# Patient Record
Sex: FEMALE | Race: WHITE | NOT HISPANIC OR LATINO | URBAN - METROPOLITAN AREA
[De-identification: names, ages, dates, MRNs, and addresses within clinical notes are randomized per-mention and may not be internally consistent; named-entity substitution may affect disease eponyms.]

---

## 2020-01-09 ENCOUNTER — INPATIENT (INPATIENT)
Facility: HOSPITAL | Age: 70
LOS: 6 days | Discharge: HOME | DRG: 194 | End: 2020-01-16
Attending: INTERNAL MEDICINE | Admitting: INTERNAL MEDICINE
Payer: MEDICARE

## 2020-01-09 VITALS
WEIGHT: 169.98 LBS | RESPIRATION RATE: 18 BRPM | HEIGHT: 67 IN | OXYGEN SATURATION: 97 % | SYSTOLIC BLOOD PRESSURE: 140 MMHG | HEART RATE: 98 BPM | TEMPERATURE: 99 F | DIASTOLIC BLOOD PRESSURE: 86 MMHG

## 2020-01-09 DIAGNOSIS — E78.5 HYPERLIPIDEMIA, UNSPECIFIED: ICD-10-CM

## 2020-01-09 DIAGNOSIS — N39.0 URINARY TRACT INFECTION, SITE NOT SPECIFIED: ICD-10-CM

## 2020-01-09 DIAGNOSIS — J15.9 UNSPECIFIED BACTERIAL PNEUMONIA: ICD-10-CM

## 2020-01-09 DIAGNOSIS — N12 TUBULO-INTERSTITIAL NEPHRITIS, NOT SPECIFIED AS ACUTE OR CHRONIC: ICD-10-CM

## 2020-01-09 DIAGNOSIS — I10 ESSENTIAL (PRIMARY) HYPERTENSION: ICD-10-CM

## 2020-01-09 DIAGNOSIS — Z29.9 ENCOUNTER FOR PROPHYLACTIC MEASURES, UNSPECIFIED: ICD-10-CM

## 2020-01-09 DIAGNOSIS — I27.82 CHRONIC PULMONARY EMBOLISM: ICD-10-CM

## 2020-01-09 DIAGNOSIS — N28.89 OTHER SPECIFIED DISORDERS OF KIDNEY AND URETER: ICD-10-CM

## 2020-01-09 LAB
ALBUMIN SERPL ELPH-MCNC: 2.8 G/DL — LOW (ref 3.5–5)
ALBUMIN SERPL ELPH-MCNC: 3.1 G/DL — LOW (ref 3.5–5)
ALP SERPL-CCNC: 88 U/L — SIGNIFICANT CHANGE UP (ref 40–120)
ALP SERPL-CCNC: 97 U/L — SIGNIFICANT CHANGE UP (ref 40–120)
ALT FLD-CCNC: 24 U/L DA — SIGNIFICANT CHANGE UP (ref 10–60)
ALT FLD-CCNC: 30 U/L DA — SIGNIFICANT CHANGE UP (ref 10–60)
ANION GAP SERPL CALC-SCNC: 11 MMOL/L — SIGNIFICANT CHANGE UP (ref 5–17)
ANION GAP SERPL CALC-SCNC: 8 MMOL/L — SIGNIFICANT CHANGE UP (ref 5–17)
APPEARANCE UR: CLEAR — SIGNIFICANT CHANGE UP
AST SERPL-CCNC: 17 U/L — SIGNIFICANT CHANGE UP (ref 10–40)
AST SERPL-CCNC: 22 U/L — SIGNIFICANT CHANGE UP (ref 10–40)
BASOPHILS # BLD AUTO: 0 K/UL — SIGNIFICANT CHANGE UP (ref 0–0.2)
BASOPHILS # BLD AUTO: 0 K/UL — SIGNIFICANT CHANGE UP (ref 0–0.2)
BASOPHILS NFR BLD AUTO: 0 % — SIGNIFICANT CHANGE UP (ref 0–2)
BASOPHILS NFR BLD AUTO: 0 % — SIGNIFICANT CHANGE UP (ref 0–2)
BILIRUB SERPL-MCNC: 0.4 MG/DL — SIGNIFICANT CHANGE UP (ref 0.2–1.2)
BILIRUB SERPL-MCNC: 0.5 MG/DL — SIGNIFICANT CHANGE UP (ref 0.2–1.2)
BILIRUB UR-MCNC: NEGATIVE — SIGNIFICANT CHANGE UP
BUN SERPL-MCNC: 22 MG/DL — HIGH (ref 7–18)
BUN SERPL-MCNC: 23 MG/DL — HIGH (ref 7–18)
CALCIUM SERPL-MCNC: 8 MG/DL — LOW (ref 8.4–10.5)
CALCIUM SERPL-MCNC: 8.7 MG/DL — SIGNIFICANT CHANGE UP (ref 8.4–10.5)
CHLORIDE SERPL-SCNC: 105 MMOL/L — SIGNIFICANT CHANGE UP (ref 96–108)
CHLORIDE SERPL-SCNC: 110 MMOL/L — HIGH (ref 96–108)
CHOLEST SERPL-MCNC: 114 MG/DL — SIGNIFICANT CHANGE UP (ref 10–199)
CO2 SERPL-SCNC: 20 MMOL/L — LOW (ref 22–31)
CO2 SERPL-SCNC: 24 MMOL/L — SIGNIFICANT CHANGE UP (ref 22–31)
COLOR SPEC: YELLOW — SIGNIFICANT CHANGE UP
CREAT SERPL-MCNC: 1.14 MG/DL — SIGNIFICANT CHANGE UP (ref 0.5–1.3)
CREAT SERPL-MCNC: 1.16 MG/DL — SIGNIFICANT CHANGE UP (ref 0.5–1.3)
DIFF PNL FLD: ABNORMAL
EOSINOPHIL # BLD AUTO: 0 K/UL — SIGNIFICANT CHANGE UP (ref 0–0.5)
EOSINOPHIL # BLD AUTO: 0 K/UL — SIGNIFICANT CHANGE UP (ref 0–0.5)
EOSINOPHIL NFR BLD AUTO: 0 % — SIGNIFICANT CHANGE UP (ref 0–6)
EOSINOPHIL NFR BLD AUTO: 0 % — SIGNIFICANT CHANGE UP (ref 0–6)
FLU A RESULT: SIGNIFICANT CHANGE UP
FLU A RESULT: SIGNIFICANT CHANGE UP
FLUAV AG NPH QL: SIGNIFICANT CHANGE UP
FLUBV AG NPH QL: SIGNIFICANT CHANGE UP
GLUCOSE SERPL-MCNC: 115 MG/DL — HIGH (ref 70–99)
GLUCOSE SERPL-MCNC: 122 MG/DL — HIGH (ref 70–99)
GLUCOSE UR QL: NEGATIVE — SIGNIFICANT CHANGE UP
HCT VFR BLD CALC: 38.4 % — SIGNIFICANT CHANGE UP (ref 34.5–45)
HCT VFR BLD CALC: 39.3 % — SIGNIFICANT CHANGE UP (ref 34.5–45)
HDLC SERPL-MCNC: 72 MG/DL — SIGNIFICANT CHANGE UP
HGB BLD-MCNC: 12.2 G/DL — SIGNIFICANT CHANGE UP (ref 11.5–15.5)
HGB BLD-MCNC: 12.7 G/DL — SIGNIFICANT CHANGE UP (ref 11.5–15.5)
KETONES UR-MCNC: NEGATIVE — SIGNIFICANT CHANGE UP
LACTATE SERPL-SCNC: 1.6 MMOL/L — SIGNIFICANT CHANGE UP (ref 0.7–2)
LEUKOCYTE ESTERASE UR-ACNC: ABNORMAL
LIDOCAIN IGE QN: 52 U/L — LOW (ref 73–393)
LIPID PNL WITH DIRECT LDL SERPL: 29 MG/DL — SIGNIFICANT CHANGE UP
LYMPHOCYTES # BLD AUTO: 0.2 K/UL — LOW (ref 1–3.3)
LYMPHOCYTES # BLD AUTO: 0.52 K/UL — LOW (ref 1–3.3)
LYMPHOCYTES # BLD AUTO: 1 % — LOW (ref 13–44)
LYMPHOCYTES # BLD AUTO: 2 % — LOW (ref 13–44)
MAGNESIUM SERPL-MCNC: 1.3 MG/DL — LOW (ref 1.6–2.6)
MANUAL SMEAR VERIFICATION: SIGNIFICANT CHANGE UP
MCHC RBC-ENTMCNC: 27 PG — SIGNIFICANT CHANGE UP (ref 27–34)
MCHC RBC-ENTMCNC: 27.1 PG — SIGNIFICANT CHANGE UP (ref 27–34)
MCHC RBC-ENTMCNC: 31.8 GM/DL — LOW (ref 32–36)
MCHC RBC-ENTMCNC: 32.3 GM/DL — SIGNIFICANT CHANGE UP (ref 32–36)
MCV RBC AUTO: 83.6 FL — SIGNIFICANT CHANGE UP (ref 80–100)
MCV RBC AUTO: 85.3 FL — SIGNIFICANT CHANGE UP (ref 80–100)
METAMYELOCYTES # FLD: 3 % — HIGH (ref 0–0)
MONOCYTES # BLD AUTO: 0.41 K/UL — SIGNIFICANT CHANGE UP (ref 0–0.9)
MONOCYTES # BLD AUTO: 1.03 K/UL — HIGH (ref 0–0.9)
MONOCYTES NFR BLD AUTO: 2 % — SIGNIFICANT CHANGE UP (ref 2–14)
MONOCYTES NFR BLD AUTO: 4 % — SIGNIFICANT CHANGE UP (ref 2–14)
MYELOCYTES NFR BLD: 2 % — HIGH (ref 0–0)
NEUTROPHILS # BLD AUTO: 18.62 K/UL — HIGH (ref 1.8–7.4)
NEUTROPHILS # BLD AUTO: 24.03 K/UL — HIGH (ref 1.8–7.4)
NEUTROPHILS NFR BLD AUTO: 62 % — SIGNIFICANT CHANGE UP (ref 43–77)
NEUTROPHILS NFR BLD AUTO: 76 % — SIGNIFICANT CHANGE UP (ref 43–77)
NEUTS BAND # BLD: 29 % — HIGH (ref 0–8)
NITRITE UR-MCNC: NEGATIVE — SIGNIFICANT CHANGE UP
NRBC # BLD: 0 /100 — SIGNIFICANT CHANGE UP (ref 0–0)
PH UR: 6 — SIGNIFICANT CHANGE UP (ref 5–8)
PHOSPHATE SERPL-MCNC: 1.6 MG/DL — LOW (ref 2.5–4.5)
PLAT MORPH BLD: NORMAL — SIGNIFICANT CHANGE UP
PLATELET # BLD AUTO: 287 K/UL — SIGNIFICANT CHANGE UP (ref 150–400)
PLATELET # BLD AUTO: 318 K/UL — SIGNIFICANT CHANGE UP (ref 150–400)
POTASSIUM SERPL-MCNC: 3.7 MMOL/L — SIGNIFICANT CHANGE UP (ref 3.5–5.3)
POTASSIUM SERPL-MCNC: 5 MMOL/L — SIGNIFICANT CHANGE UP (ref 3.5–5.3)
POTASSIUM SERPL-SCNC: 3.7 MMOL/L — SIGNIFICANT CHANGE UP (ref 3.5–5.3)
POTASSIUM SERPL-SCNC: 5 MMOL/L — SIGNIFICANT CHANGE UP (ref 3.5–5.3)
PROT SERPL-MCNC: 6.3 G/DL — SIGNIFICANT CHANGE UP (ref 6–8.3)
PROT SERPL-MCNC: 6.8 G/DL — SIGNIFICANT CHANGE UP (ref 6–8.3)
PROT UR-MCNC: 100
RBC # BLD: 4.5 M/UL — SIGNIFICANT CHANGE UP (ref 3.8–5.2)
RBC # BLD: 4.7 M/UL — SIGNIFICANT CHANGE UP (ref 3.8–5.2)
RBC # FLD: 14.8 % — HIGH (ref 10.3–14.5)
RBC # FLD: 15.1 % — HIGH (ref 10.3–14.5)
RBC BLD AUTO: NORMAL — SIGNIFICANT CHANGE UP
RSV RESULT: SIGNIFICANT CHANGE UP
RSV RNA RESP QL NAA+PROBE: SIGNIFICANT CHANGE UP
SODIUM SERPL-SCNC: 137 MMOL/L — SIGNIFICANT CHANGE UP (ref 135–145)
SODIUM SERPL-SCNC: 141 MMOL/L — SIGNIFICANT CHANGE UP (ref 135–145)
SP GR SPEC: 1.01 — SIGNIFICANT CHANGE UP (ref 1.01–1.02)
TOTAL CHOLESTEROL/HDL RATIO MEASUREMENT: 1.6 RATIO — LOW (ref 3.3–7.1)
TRIGL SERPL-MCNC: 67 MG/DL — SIGNIFICANT CHANGE UP (ref 10–149)
TROPONIN I SERPL-MCNC: <0.015 NG/ML — SIGNIFICANT CHANGE UP (ref 0–0.04)
TSH SERPL-MCNC: 1.55 UU/ML — SIGNIFICANT CHANGE UP (ref 0.34–4.82)
UROBILINOGEN FLD QL: NEGATIVE — SIGNIFICANT CHANGE UP
VARIANT LYMPHS # BLD: 1 % — SIGNIFICANT CHANGE UP (ref 0–6)
WBC # BLD: 20.46 K/UL — HIGH (ref 3.8–10.5)
WBC # BLD: 25.84 K/UL — HIGH (ref 3.8–10.5)
WBC # FLD AUTO: 20.46 K/UL — HIGH (ref 3.8–10.5)
WBC # FLD AUTO: 25.84 K/UL — HIGH (ref 3.8–10.5)

## 2020-01-09 PROCEDURE — 71046 X-RAY EXAM CHEST 2 VIEWS: CPT | Mod: 26

## 2020-01-09 PROCEDURE — 76775 US EXAM ABDO BACK WALL LIM: CPT | Mod: 26

## 2020-01-09 PROCEDURE — 71275 CT ANGIOGRAPHY CHEST: CPT | Mod: 26

## 2020-01-09 PROCEDURE — 93970 EXTREMITY STUDY: CPT | Mod: 26

## 2020-01-09 PROCEDURE — 99285 EMERGENCY DEPT VISIT HI MDM: CPT

## 2020-01-09 PROCEDURE — 76770 US EXAM ABDO BACK WALL COMP: CPT | Mod: 26

## 2020-01-09 RX ORDER — POTASSIUM PHOSPHATE, MONOBASIC POTASSIUM PHOSPHATE, DIBASIC 236; 224 MG/ML; MG/ML
15 INJECTION, SOLUTION INTRAVENOUS ONCE
Refills: 0 | Status: COMPLETED | OUTPATIENT
Start: 2020-01-09 | End: 2020-01-10

## 2020-01-09 RX ORDER — SIMVASTATIN 20 MG/1
40 TABLET, FILM COATED ORAL AT BEDTIME
Refills: 0 | Status: DISCONTINUED | OUTPATIENT
Start: 2020-01-09 | End: 2020-01-16

## 2020-01-09 RX ORDER — ENOXAPARIN SODIUM 100 MG/ML
70 INJECTION SUBCUTANEOUS
Refills: 0 | Status: DISCONTINUED | OUTPATIENT
Start: 2020-01-09 | End: 2020-01-14

## 2020-01-09 RX ORDER — AZITHROMYCIN 500 MG/1
500 TABLET, FILM COATED ORAL ONCE
Refills: 0 | Status: COMPLETED | OUTPATIENT
Start: 2020-01-09 | End: 2020-01-09

## 2020-01-09 RX ORDER — ACETAMINOPHEN 500 MG
650 TABLET ORAL EVERY 6 HOURS
Refills: 0 | Status: DISCONTINUED | OUTPATIENT
Start: 2020-01-09 | End: 2020-01-16

## 2020-01-09 RX ORDER — VENLAFAXINE HCL 75 MG
1 CAPSULE, EXT RELEASE 24 HR ORAL
Qty: 0 | Refills: 0 | DISCHARGE

## 2020-01-09 RX ORDER — ENOXAPARIN SODIUM 100 MG/ML
40 INJECTION SUBCUTANEOUS DAILY
Refills: 0 | Status: DISCONTINUED | OUTPATIENT
Start: 2020-01-09 | End: 2020-01-09

## 2020-01-09 RX ORDER — SIMVASTATIN 20 MG/1
1 TABLET, FILM COATED ORAL
Qty: 0 | Refills: 0 | DISCHARGE

## 2020-01-09 RX ORDER — CEFTRIAXONE 500 MG/1
1000 INJECTION, POWDER, FOR SOLUTION INTRAMUSCULAR; INTRAVENOUS ONCE
Refills: 0 | Status: COMPLETED | OUTPATIENT
Start: 2020-01-09 | End: 2020-01-09

## 2020-01-09 RX ORDER — ACETAMINOPHEN 500 MG
1000 TABLET ORAL ONCE
Refills: 0 | Status: COMPLETED | OUTPATIENT
Start: 2020-01-09 | End: 2020-01-09

## 2020-01-09 RX ORDER — AMLODIPINE BESYLATE 2.5 MG/1
1 TABLET ORAL
Qty: 0 | Refills: 0 | DISCHARGE

## 2020-01-09 RX ORDER — METOPROLOL TARTRATE 50 MG
1 TABLET ORAL
Qty: 0 | Refills: 0 | DISCHARGE

## 2020-01-09 RX ORDER — VENLAFAXINE HCL 75 MG
150 CAPSULE, EXT RELEASE 24 HR ORAL DAILY
Refills: 0 | Status: DISCONTINUED | OUTPATIENT
Start: 2020-01-09 | End: 2020-01-16

## 2020-01-09 RX ORDER — MAGNESIUM SULFATE 500 MG/ML
2 VIAL (ML) INJECTION ONCE
Refills: 0 | Status: COMPLETED | OUTPATIENT
Start: 2020-01-09 | End: 2020-01-09

## 2020-01-09 RX ORDER — SODIUM CHLORIDE 9 MG/ML
2000 INJECTION INTRAMUSCULAR; INTRAVENOUS; SUBCUTANEOUS ONCE
Refills: 0 | Status: COMPLETED | OUTPATIENT
Start: 2020-01-09 | End: 2020-01-09

## 2020-01-09 RX ORDER — KETOROLAC TROMETHAMINE 30 MG/ML
30 SYRINGE (ML) INJECTION ONCE
Refills: 0 | Status: DISCONTINUED | OUTPATIENT
Start: 2020-01-09 | End: 2020-01-09

## 2020-01-09 RX ADMIN — Medication 30 MILLIGRAM(S): at 13:14

## 2020-01-09 RX ADMIN — CEFTRIAXONE 100 MILLIGRAM(S): 500 INJECTION, POWDER, FOR SOLUTION INTRAMUSCULAR; INTRAVENOUS at 18:30

## 2020-01-09 RX ADMIN — AZITHROMYCIN 255 MILLIGRAM(S): 500 TABLET, FILM COATED ORAL at 19:02

## 2020-01-09 RX ADMIN — Medication 400 MILLIGRAM(S): at 13:13

## 2020-01-09 RX ADMIN — Medication 50 GRAM(S): at 22:52

## 2020-01-09 RX ADMIN — SODIUM CHLORIDE 2000 MILLILITER(S): 9 INJECTION INTRAMUSCULAR; INTRAVENOUS; SUBCUTANEOUS at 13:13

## 2020-01-09 NOTE — H&P ADULT - PROBLEM SELECTOR PLAN 7
IMPROVE VTE Individual Risk Assessment   RISK                                                          Points  [  ] Previous VTE                                                3  [  ] Thrombophilia                                             2  [  ] Lower limb paralysis                                    2        (unable to hold up >15 seconds)    [  ] Current Cancer                                             2         (within 6 months)  [  x] Immobilization > 24 hrs                              1  [  ] ICU/CCU stay > 24 hours                            1  [x  ] Age > 60                                                    1  IMPROVE VTE Score _________1  Lovenox for DVT prophylaxis

## 2020-01-09 NOTE — ED PROVIDER NOTE - OBJECTIVE STATEMENT
68 y/o F pt with a PMHx of HTN, presents to the ED with complaints of 2 week hx of URI symptoms. Patient reports she originally had cough and low grade fever x 2 weeks and treated with NSAIDs with mild improvement. Notes her cough is nonproductive and persistent. Patient reports in the last 2 days her fever worsened and she developed flank pain. Notes she had nausea that has now resolved. Patient denies dysuria, urinary urgency, urinary frequency, hematuria, abdominal pain, chest pain, shortness of breath, lower extremity edema or any other complaints. Allergies: sulfa drugs

## 2020-01-09 NOTE — ED ADULT NURSE NOTE - OBJECTIVE STATEMENT
RN KATELYN SY COVERING NOTES: AOX4 +ambulatory patient reports generalized bodyaches, cough x 1 week. No other acute complaints

## 2020-01-09 NOTE — ED PROVIDER NOTE - PROGRESS NOTE DETAILS
Labs show evidence of Pyelo. Discussed admission with patient. Requesting more time to consider admission. Patient endorsed to Dr. alfonso to follow up imaging and disposition.

## 2020-01-09 NOTE — ED PROVIDER NOTE - ENMT, MLM
[facility] Call Center    Phone Message    Name of Caller: Atilio    Phone Number: Cell number on file:    Telephone Information:   Mobile [phone]       Best time to return call: any    May a detailed message be left on voicemail: yes    Relation to patient: Self    Reason for Call: Other: Patient states he has questions about if he should be making a follow up appointment. Patient states that Dr. Vega put him on anitbotics and is wondering if it would be worth it to make a follow up appointment.     Action Taken: Message routed to:  Adult Clinics: Urology p [id number]     Airway patent, Nasal mucosa clear. Mouth with normal mucosa. Throat has no vesicles, no oropharyngeal exudates and uvula is midline.

## 2020-01-09 NOTE — ED ADULT NURSE NOTE - ED STAT RN HANDOFF DETAILS
received  pt.in bed at 0300 pt.is  awake and responsive.  denies  pain. transfer to rm 621 report given by previous rn ramana.not in distress

## 2020-01-09 NOTE — ED PROVIDER NOTE - CARE PLAN
Principal Discharge DX:	Pyelonephritis Principal Discharge DX:	Pyelonephritis  Secondary Diagnosis:	Pneumonia due to infectious organism, unspecified laterality, unspecified part of lung

## 2020-01-09 NOTE — H&P ADULT - HISTORY OF PRESENT ILLNESS
Pt is a 68 y/o F with PMH of HTN, HLD, Latent TB who presented with cough, runny nose, weakness and fever since 1 and half week.  According to  the pt, she started developing cough with whitish sputum since 1 1/2 week which is getting worse. She had some blood on her sputum today.  She has been having low grade fever since same time period along with chills,  generalized weakness, decreased appetite, headache, back pain. Today when she went to [facility],  she felt like almost passing out and looked pale and clammy as per .  She also complained of burning sensation while passing urine  and occasional urinary incontinence. Pt is a 70 y/o F with PMH of HTN, HLD, Latent TB PSH of rt. knee surgery who presented with cough, runny nose, weakness and fever since 1 and half week.  According to  the pt, she started developing cough with whitish sputum since 1 1/2 week which is getting worse. She had some blood on her sputum today.  She has been having low grade fever since same time period along with chills,  generalized weakness, decreased appetite, headache, back pain. Today when she went to [facility],  she felt like almost passing out and looked pale and clammy as per .  She also complained of burning sensation while passing urine  and occasional urinary incontinence.

## 2020-01-09 NOTE — H&P ADULT - NEUROLOGICAL DETAILS
responds to verbal commands/responds to pain/deep reflexes intact/cranial nerves intact/alert and oriented x 3/sensation intact/normal strength

## 2020-01-09 NOTE — H&P ADULT - PROBLEM SELECTOR PLAN 5
IMPROVE VTE Individual Risk Assessment   RISK                                                          Points  [  ] Previous VTE                                                3  [  ] Thrombophilia                                             2  [  ] Lower limb paralysis                                    2        (unable to hold up >15 seconds)    [  ] Current Cancer                                             2         (within 6 months)  [  x] Immobilization > 24 hrs                              1  [  ] ICU/CCU stay > 24 hours                            1  [x  ] Age > 60                                                    1  IMPROVE VTE Score _________1  Lovenox for DVT prophylaxis Pt takes amlodipine and metoprolol at home  We will hold BP meds in the setting of PNA  Monitor Blood pressure

## 2020-01-09 NOTE — ED PROVIDER NOTE - CHPI ED SYMPTOMS NEG
no dysuria, no urinary urgency, no urinary frequency, no hematuria, no abdominal pain, no chest pain, no shortness of breath, no lower extremity edema

## 2020-01-09 NOTE — H&P ADULT - NEGATIVE NEUROLOGICAL SYMPTOMS
no weakness/no generalized seizures/no vertigo/no difficulty walking/no transient paralysis/no paresthesias/no syncope

## 2020-01-09 NOTE — H&P ADULT - PROBLEM SELECTOR PLAN 4
Right upper pole renal mass or prominent congenital lobulation was noted on CTA chest.  Urology consult at am

## 2020-01-09 NOTE — H&P ADULT - PROBLEM SELECTOR PLAN 3
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 Lindsey consulted

## 2020-01-09 NOTE — ED PROVIDER NOTE - CLINICAL SUMMARY MEDICAL DECISION MAKING FREE TEXT BOX
70 y/o F pt presents with URI symptoms and fevers. Will do cbc, abdominal labs, cardiac labs, UA culture, lactate and reassess.

## 2020-01-09 NOTE — PROGRESS NOTE ADULT - ASSESSMENT
francia nd examined  pneumonia  and uti ( had symptoms)   iv fluids and rocephine zithro.  flue A B,   pulm embolus ?/ i d/w pulmonary  full dose anticogulation    lactate, vbg  and watch carefully on floor otherwise icu d/w resident  watch bp.

## 2020-01-09 NOTE — H&P ADULT - RS GEN PE MLT RESP DETAILS PC
breath sounds equal/clear to auscultation bilaterally/airway patent/respirations non-labored/good air movement/no chest wall tenderness

## 2020-01-09 NOTE — H&P ADULT - PROBLEM SELECTOR PLAN 1
CTA chest showed signs of right lower lobe  PNA  Continue Ceftriaxone and Azithromycin  F/u Blood culture. ABx started at ED before sending blood culture.

## 2020-01-09 NOTE — H&P ADULT - NSHPLABSRESULTS_GEN_ALL_CORE
12.2   20.46 )-----------( 287      ( 09 Jan 2020 20:59 )             38.4       01-09    141  |  110<H>  |  22<H>  ----------------------------<  122<H>  3.7   |  20<L>  |  1.16    Ca    8.0<L>      09 Jan 2020 20:59  Phos  1.6     01-09  Mg     1.3     01-09    TPro  6.3  /  Alb  2.8<L>  /  TBili  0.4  /  DBili  x   /  AST  17  /  ALT  24  /  AlkPhos  88  01-09

## 2020-01-09 NOTE — PROGRESS NOTE ADULT - SUBJECTIVE AND OBJECTIVE BOX
HPI: 69 year old female brought in for cough over 10 days mildly productive   had low grade fever  ( her  was sick initially)  no muscle pain pt was keep coughing never got better from last a few days getting worse  no apetite  and today she felt very weak almost passed out  brought to er  wher has some blood in cough  minimal.  non smoker not on AC.  wbc 25, cxr showed infiltate, ct angio showed old PE   venous doppler negat.  no recent surgery or travel  pt is over wt but active . given iv fluids and iv rocephine   pt is alert awke x 3     pt has h/o htn, hld, rt knee and ankle           Patient is a 69y old  Female who presents with a chief complaint of     INTERVAL HPI/OVERNIGHT EVENTS:  T(C): 37.1 (20 @ 15:56), Max: 37.2 (20 @ 11:33)  HR: 97 (20 @ 15:56) (97 - 98)  BP: 130/63 (20 @ 15:56) (130/63 - 140/86)  RR: 18 (20 @ 15:56) (18 - 18)  SpO2: 96% (20 @ 15:56) (96% - 97%)  Wt(kg): --  I&O's Summary      REVIEW OF SYSTEMS: denies fever, chills, SOB, palpitations, chest pain, abdominal pain, nausea, vomitting, diarrhea, constipation, dizziness    MEDICATIONS  (STANDING):    MEDICATIONS  (PRN):      PHYSICAL EXAM:  GENERAL: NAD, well-groomed, well-developed  HEAD:  Atraumatic, Normocephalic  EYES: EOMI, PERRLA, conjunctiva and sclera clear  ENMT: No tonsillar erythema, exudates, or enlargement; Moist mucous membranes, Good dentition, No lesions  NECK: Supple, No JVD, Normal thyroid  NERVOUS SYSTEM:  Alert & Oriented X3, Good concentration; Motor Strength 5/5 B/L upper and lower extremities; DTRs 2+ intact and symmetric  CHEST/LUNG: Clear to percussion bilaterally; No rales, rhonchi, wheezing, or rubs  HEART: Regular rate and rhythm; No murmurs, rubs, or gallops  ABDOMEN: Soft, Nontender, Nondistended; Bowel sounds present  EXTREMITIES:  2+ Peripheral Pulses, No clubbing, cyanosis, or edema  LYMPH: No lymphadenopathy noted  SKIN: No rashes or lesions  LABS:                        12.2   x     )-----------( 287      ( 2020 20:59 )             38.4     -    137  |  105  |  23<H>  ----------------------------<  115<H>  5.0   |  24  |  1.14    Ca    8.7      2020 13:09    TPro  6.8  /  Alb  3.1<L>  /  TBili  0.5  /  DBili  x   /  AST  22  /  ALT  30  /  AlkPhos  97  01-09      Urinalysis Basic - ( 2020 15:26 )    Color: Yellow / Appearance: Clear / S.010 / pH: x  Gluc: x / Ketone: Negative  / Bili: Negative / Urobili: Negative   Blood: x / Protein: 100 / Nitrite: Negative   Leuk Esterase: Small / RBC: 0-2 /HPF / WBC 26-50 /HPF   Sq Epi: x / Non Sq Epi: Few /HPF / Bacteria: TNTC /HPF      CAPILLARY BLOOD GLUCOSE            Urinalysis Basic - ( 2020 15:26 )    Color: Yellow / Appearance: Clear / S.010 / pH: x  Gluc: x / Ketone: Negative  / Bili: Negative / Urobili: Negative   Blood: x / Protein: 100 / Nitrite: Negative   Leuk Esterase: Small / RBC: 0-2 /HPF / WBC 26-50 /HPF   Sq Epi: x / Non Sq Epi: Few /HPF / Bacteria: TNTC /HPF

## 2020-01-10 ENCOUNTER — TRANSCRIPTION ENCOUNTER (OUTPATIENT)
Age: 70
End: 2020-01-10

## 2020-01-10 DIAGNOSIS — Z71.89 OTHER SPECIFIED COUNSELING: ICD-10-CM

## 2020-01-10 LAB
24R-OH-CALCIDIOL SERPL-MCNC: 20.9 NG/ML — LOW (ref 30–80)
24R-OH-CALCIDIOL SERPL-MCNC: 22.2 NG/ML — LOW (ref 30–80)
ANION GAP SERPL CALC-SCNC: 11 MMOL/L — SIGNIFICANT CHANGE UP (ref 5–17)
BUN SERPL-MCNC: 21 MG/DL — HIGH (ref 7–18)
CALCIUM SERPL-MCNC: 8.5 MG/DL — SIGNIFICANT CHANGE UP (ref 8.4–10.5)
CHLORIDE SERPL-SCNC: 103 MMOL/L — SIGNIFICANT CHANGE UP (ref 96–108)
CHOLEST SERPL-MCNC: 120 MG/DL — SIGNIFICANT CHANGE UP (ref 10–199)
CO2 SERPL-SCNC: 23 MMOL/L — SIGNIFICANT CHANGE UP (ref 22–31)
CREAT SERPL-MCNC: 1.02 MG/DL — SIGNIFICANT CHANGE UP (ref 0.5–1.3)
GLUCOSE SERPL-MCNC: 111 MG/DL — HIGH (ref 70–99)
HBA1C BLD-MCNC: 5.6 % — SIGNIFICANT CHANGE UP (ref 4–5.6)
HBA1C BLD-MCNC: 5.8 % — HIGH (ref 4–5.6)
HCT VFR BLD CALC: 37.6 % — SIGNIFICANT CHANGE UP (ref 34.5–45)
HCV AB S/CO SERPL IA: 0.09 S/CO — SIGNIFICANT CHANGE UP (ref 0–0.99)
HCV AB SERPL-IMP: SIGNIFICANT CHANGE UP
HDLC SERPL-MCNC: 61 MG/DL — SIGNIFICANT CHANGE UP
HGB BLD-MCNC: 12.2 G/DL — SIGNIFICANT CHANGE UP (ref 11.5–15.5)
LIPID PNL WITH DIRECT LDL SERPL: 41 MG/DL — SIGNIFICANT CHANGE UP
MAGNESIUM SERPL-MCNC: 2.4 MG/DL — SIGNIFICANT CHANGE UP (ref 1.6–2.6)
MCHC RBC-ENTMCNC: 27.3 PG — SIGNIFICANT CHANGE UP (ref 27–34)
MCHC RBC-ENTMCNC: 32.4 GM/DL — SIGNIFICANT CHANGE UP (ref 32–36)
MCV RBC AUTO: 84.1 FL — SIGNIFICANT CHANGE UP (ref 80–100)
NRBC # BLD: 0 /100 WBCS — SIGNIFICANT CHANGE UP (ref 0–0)
PHOSPHATE SERPL-MCNC: 3.6 MG/DL — SIGNIFICANT CHANGE UP (ref 2.5–4.5)
PLATELET # BLD AUTO: 286 K/UL — SIGNIFICANT CHANGE UP (ref 150–400)
POTASSIUM SERPL-MCNC: 3.8 MMOL/L — SIGNIFICANT CHANGE UP (ref 3.5–5.3)
POTASSIUM SERPL-SCNC: 3.8 MMOL/L — SIGNIFICANT CHANGE UP (ref 3.5–5.3)
PROCALCITONIN SERPL-MCNC: 3.87 NG/ML — HIGH (ref 0.02–0.1)
RBC # BLD: 4.47 M/UL — SIGNIFICANT CHANGE UP (ref 3.8–5.2)
RBC # FLD: 15.4 % — HIGH (ref 10.3–14.5)
SODIUM SERPL-SCNC: 137 MMOL/L — SIGNIFICANT CHANGE UP (ref 135–145)
TOTAL CHOLESTEROL/HDL RATIO MEASUREMENT: 2 RATIO — LOW (ref 3.3–7.1)
TRIGL SERPL-MCNC: 91 MG/DL — SIGNIFICANT CHANGE UP (ref 10–149)
TSH SERPL-MCNC: 1.21 UU/ML — SIGNIFICANT CHANGE UP (ref 0.34–4.82)
VIT B12 SERPL-MCNC: 1202 PG/ML — SIGNIFICANT CHANGE UP (ref 232–1245)
VIT B12 SERPL-MCNC: 1562 PG/ML — HIGH (ref 232–1245)
WBC # BLD: 26.16 K/UL — HIGH (ref 3.8–10.5)
WBC # FLD AUTO: 26.16 K/UL — HIGH (ref 3.8–10.5)

## 2020-01-10 RX ORDER — IPRATROPIUM BROMIDE 0.2 MG/ML
500 SOLUTION, NON-ORAL INHALATION EVERY 6 HOURS
Refills: 0 | Status: DISCONTINUED | OUTPATIENT
Start: 2020-01-10 | End: 2020-01-13

## 2020-01-10 RX ORDER — INFLUENZA VIRUS VACCINE 15; 15; 15; 15 UG/.5ML; UG/.5ML; UG/.5ML; UG/.5ML
0.5 SUSPENSION INTRAMUSCULAR ONCE
Refills: 0 | Status: DISCONTINUED | OUTPATIENT
Start: 2020-01-10 | End: 2020-01-16

## 2020-01-10 RX ORDER — AZITHROMYCIN 500 MG/1
500 TABLET, FILM COATED ORAL EVERY 24 HOURS
Refills: 0 | Status: DISCONTINUED | OUTPATIENT
Start: 2020-01-10 | End: 2020-01-14

## 2020-01-10 RX ORDER — CEFTRIAXONE 500 MG/1
1000 INJECTION, POWDER, FOR SOLUTION INTRAMUSCULAR; INTRAVENOUS EVERY 24 HOURS
Refills: 0 | Status: DISCONTINUED | OUTPATIENT
Start: 2020-01-10 | End: 2020-01-16

## 2020-01-10 RX ADMIN — Medication 650 MILLIGRAM(S): at 22:52

## 2020-01-10 RX ADMIN — ENOXAPARIN SODIUM 70 MILLIGRAM(S): 100 INJECTION SUBCUTANEOUS at 06:26

## 2020-01-10 RX ADMIN — CEFTRIAXONE 1000 MILLIGRAM(S): 500 INJECTION, POWDER, FOR SOLUTION INTRAMUSCULAR; INTRAVENOUS at 03:32

## 2020-01-10 RX ADMIN — AZITHROMYCIN 500 MILLIGRAM(S): 500 TABLET, FILM COATED ORAL at 03:32

## 2020-01-10 RX ADMIN — SIMVASTATIN 40 MILLIGRAM(S): 20 TABLET, FILM COATED ORAL at 22:19

## 2020-01-10 RX ADMIN — ENOXAPARIN SODIUM 70 MILLIGRAM(S): 100 INJECTION SUBCUTANEOUS at 17:18

## 2020-01-10 RX ADMIN — Medication 100 MILLIGRAM(S): at 17:17

## 2020-01-10 RX ADMIN — AZITHROMYCIN 255 MILLIGRAM(S): 500 TABLET, FILM COATED ORAL at 17:18

## 2020-01-10 RX ADMIN — Medication 30 MILLIGRAM(S): at 02:53

## 2020-01-10 RX ADMIN — Medication 150 MILLIGRAM(S): at 11:09

## 2020-01-10 RX ADMIN — Medication 100 MILLIGRAM(S): at 04:10

## 2020-01-10 RX ADMIN — POTASSIUM PHOSPHATE, MONOBASIC POTASSIUM PHOSPHATE, DIBASIC 62.5 MILLIMOLE(S): 236; 224 INJECTION, SOLUTION INTRAVENOUS at 03:26

## 2020-01-10 RX ADMIN — Medication 1000 MILLIGRAM(S): at 03:32

## 2020-01-10 RX ADMIN — CEFTRIAXONE 100 MILLIGRAM(S): 500 INJECTION, POWDER, FOR SOLUTION INTRAMUSCULAR; INTRAVENOUS at 17:18

## 2020-01-10 RX ADMIN — Medication 1000 MILLIGRAM(S): at 02:53

## 2020-01-10 RX ADMIN — Medication 650 MILLIGRAM(S): at 22:22

## 2020-01-10 NOTE — PROGRESS NOTE ADULT - SUBJECTIVE AND OBJECTIVE BOX
Chart reviewed.  Patient seen and examined earlier this am and now    HPI: 69 year old Woman with hx of HTN, HLD, Latent TB PSH of rt. knee surgery who presented on 2020 with c/o 10 day hx of cough, runny nose, weakness, chills, fever with blood tinged sputum same day of ED arrival. Found to have RLL PNA; pt admitted for CAP and CT angio with possible chronic PE     Subjective/ROS; had SOB on rm air this am with activity, better this pm with supplemental O2 in place; Denies CP/palpitation/HA/dizziness/abd pain/n/v/d/f/c  REVIEW OF SYSTEM: ROS comprehensively negative except as above    MEDICATIONS  (STANDING):  azithromycin  IVPB 500 milliGRAM(s) IV Intermittent every 24 hours  cefTRIAXone   IVPB 1000 milliGRAM(s) IV Intermittent every 24 hours  enoxaparin Injectable 70 milliGRAM(s) SubCutaneous two times a day  influenza   Vaccine 0.5 milliLiter(s) IntraMuscular once  simvastatin 40 milliGRAM(s) Oral at bedtime  venlafaxine XR. 150 milliGRAM(s) Oral daily    MEDICATIONS  (PRN):  acetaminophen   Tablet .. 650 milliGRAM(s) Oral every 6 hours PRN Mild Pain (1 - 3)  guaiFENesin   Syrup  (Sugar-Free) 100 milliGRAM(s) Oral every 6 hours PRN Cough    VITALS:  Vital Signs Last 24 Hrs  T(C): 36.9 (10 Richie 2020 14:08), Max: 37 (2020 20:17)  T(F): 98.4 (10 Richie 2020 14:08), Max: 98.6 (2020 20:17)  HR: 93 (10 Richie 2020 14:08) (93 - 123)  BP: 127/63 (10 Richie 2020 14:08) (125/67 - 152/88)  BP(mean): --  RR: 18 (10 Richie 2020 14:08) (18 - 18)  SpO2: 98% (10 Richie 2020 14:08) (93% - 98%)      PHYSICAL EXAM:    HEENT:  pupils equal and reactive, EOMI, no oropharyngeal lesions, erythema, exudates, oral thrush    NECK:   supple, no carotid bruits, no palpable lymph nodes, no thyromegaly    CV:  +S1, +S2, regular, no murmurs or rubs    RESP: scattered bibasilar rales with decrease breath sounds > right; no wheezing    BREAST:  not examined    GI:  abdomen soft, non-tender, non-distended, normal BS, no bruits, no abdominal masses, no palpable masses    RECTAL:  not examined    :  not examined    MSK:   normal muscle tone, no atrophy, no rigidity, no contractions    EXT:   no clubbing, no cyanosis, no edema, no calf pain, swelling or erythema    VASCULAR:  pulses equal and symmetric in the upper and lower extremities    NEURO:  AAOX3, no focal neurological deficits, follows all commands, able to move extremities spontaneously    SKIN:  no ulcers, lesions or rashes       LABS:                      12.2   26.16 )-----------( 286      ( 10 Richie 2020 08:16 )             37.6     01-10    137  |  103  |  21<H>  ----------------------------<  111<H>  3.8   |  23  |  1.02    Ca    8.5      10 Richie 2020 08:16  Phos  3.6     01-10  Mg     2.4     -10    TPro  6.3  /  Alb  2.8<L>  /  TBili  0.4  /  DBili  x   /  AST  17  /  ALT  24  /  AlkPhos  88  01-09    CARDIAC MARKERS ( 2020 13:09 )  <0.015 ng/mL / x     / x     / x     / x        LIVER FUNCTIONS - ( 2020 20:59 )  Alb: 2.8 g/dL / Pro: 6.3 g/dL / ALK PHOS: 88 U/L / ALT: 24 U/L DA / AST: 17 U/L / GGT: x           Urinalysis Basic - ( 2020 15:26 )    Color: Yellow / Appearance: Clear / S.010 / pH: x  Gluc: x / Ketone: Negative  / Bili: Negative / Urobili: Negative   Blood: x / Protein: 100 / Nitrite: Negative   Leuk Esterase: Small / RBC: 0-2 /HPF / WBC 26-50 /HPF   Sq Epi: x / Non Sq Epi: Few /HPF / Bacteria: TNTC /HPF    < from: CT Angio Chest w/ IV Cont (20 @ 15:43) >  FINDINGS:    LUNGS AND AIRWAYS: Patent central airways.  Right lower lobe consolidation. Dependent and basilar atelectasis. Nodular density along the horizontal fissure, possibly lymph node.    PLEURA: Small right pleural effusion.    MEDIASTINUM AND CHAMP: Prominent mediastinal and right hilar lymph nodes.    VESSELS: Atherosclerotic changes. No thoracic aortic aneurysm or dissection. Linear filling defect within the middle lobe branch of the right pulmonary artery, possibly chronic embolus or web. Otherwise, no acute appearing pulmonary arterial filling defects.    HEART: Heart size is normal. No pericardial effusion.    CHEST WALL AND LOWER NECK: Within normal limits.    VISUALIZED UPPER ABDOMEN: Partially imaged right upper pole renal mass measuring 3.7 cm or partially imaged prominent congenital lobulation.    BONES: Degenerative changes.    IMPRESSION:     1. 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  2. Otherwise, no evidence of acute pulmonary emboli.  3. Right lower lobe pneumonia.  4. Partially imaged right upper pole renal mass or prominent congenital lobulation. Recommend renal ultrasound for further assessment.    < end of copied text >

## 2020-01-10 NOTE — DISCHARGE NOTE PROVIDER - CARE PROVIDER_API CALL
Shon Paz  PMKEZIA in [facility]  Phone: (   )    -  Fax: (   )    -  Follow Up Time: 2 weeks

## 2020-01-10 NOTE — CONSULT NOTE ADULT - SUBJECTIVE AND OBJECTIVE BOX
Time of visit:    CHIEF COMPLAINT: Patient is a 69y old  Female who presents with a chief complaint of Cough, fever, (2020 21:54)      HPI:  Pt is a 70 y/o F with PMH of HTN, HLD, Latent TB PSH of rt. knee surgery who presented with cough, runny nose, weakness and fever since 1 and half week.  According to  the pt, she started developing cough with whitish sputum since 1 1/2 week which is getting worse. She had some blood on her sputum today.  She has been having low grade fever since same time period along with chills,  generalized weakness, decreased appetite, headache, back pain. Today when she went to [facility],  she felt like almost passing out and looked pale and clammy as per .  She also complained of burning sensation while passing urine  and occasional urinary incontinence. (2020 21:54)   Patient seen and examined.     PAST MEDICAL & SURGICAL HISTORY:  Latent tuberculosis  Hyperlipidemia  HTN (hypertension)      Allergies    sulfa drugs (Rash)  Xylocaine 10% Oral (Short breath)    Intolerances        MEDICATIONS  (STANDING):  azithromycin  IVPB 500 milliGRAM(s) IV Intermittent every 24 hours  cefTRIAXone   IVPB 1000 milliGRAM(s) IV Intermittent every 24 hours  enoxaparin Injectable 70 milliGRAM(s) SubCutaneous two times a day  influenza   Vaccine 0.5 milliLiter(s) IntraMuscular once  simvastatin 40 milliGRAM(s) Oral at bedtime  venlafaxine XR. 150 milliGRAM(s) Oral daily      MEDICATIONS  (PRN):  acetaminophen   Tablet .. 650 milliGRAM(s) Oral every 6 hours PRN Mild Pain (1 - 3)  guaiFENesin   Syrup  (Sugar-Free) 100 milliGRAM(s) Oral every 6 hours PRN Cough   Medications up to date at time of exam.    Medications up to date at time of exam.    FAMILY HISTORY:      SOCIAL HISTORY  Smoking History: Denies smoking exposure.   Living Condition: [   ] apartment, [   ] private house  Work History:   Travel History: denies recent travel  Illicit Substance Use: denies  Alcohol Use: denies    REVIEW OF SYSTEMS:    CONSTITUTIONAL:  Episode of  fevers but Afebrile on exam . Episode of  chills yesterday . denies night  sweats.     HEENT:  denies diplopia or blurred vision, sore throat or runny nose.    CARDIOVASCULAR:  denies pressure, squeezing, tightness, or heaviness about the chest; no palpitations.    RESPIRATORY:  Verbalized of episodic SOB on exertion. Has dry non productive cough Denies LEGGETT. no wheezing.    GASTROINTESTINAL:  denies abdominal pain, nausea, vomiting or diarrhea.    GENITOURINARY: denies dysuria, frequency or urgency.    NEUROLOGIC:  denies numbness, tingling, seizures or weakness.    PSYCHIATRIC:  denies disorder of thought or mood.    MSK: denies swelling, redness      PHYSICAL EXAMINATION:    GENERAL: Alert and oriented. No acute distress. Able to answer question with no SOB.      Vital Signs Last 24 Hrs  T(C): 36.9 (10 Richie 2020 14:08), Max: 37.1 (2020 15:56)  T(F): 98.4 (10 Richie 2020 14:08), Max: 98.7 (2020 15:56)  HR: 93 (10 Richie 2020 14:08) (93 - 123)  BP: 127/63 (10 Richie 2020 14:08) (125/67 - 152/88)  BP(mean): --  RR: 18 (10 Richie 2020 14:08) (18 - 18)  SpO2: 98% (10 Richie 2020 14:08) (93% - 98%)   (if applicable)        HEENT: Head is normocephalic and atraumatic. No nasal tenderness . Extraocular muscles are intact. Mucous membranes are moist.     NECK: Supple, no palpable adenopathy.    LUNGS: Clear to auscultation, no wheezing, rales, or rhonchi. No use of accessory muscle.     HEART: S1 S2 Regular rate and no click/ rub.     ABDOMEN: Soft, nontender, and nondistended.  No abdominal guarding .     EXTREMITIES: Without any cyanosis, clubbing, rash, lesions or edema.    NEUROLOGIC: Awake, alert, oriented.     SKIN: Warm and moist. Non diaphoretic.        LABS:                        12.2   26.16 )-----------( 286      ( 10 Richie 2020 08:16 )             37.6     01-10    137  |  103  |  21<H>  ----------------------------<  111<H>  3.8   |  23  |  1.02    Ca    8.5      10 Richie 2020 08:16  Phos  3.6     01-10  Mg     2.4     01-10    TPro  6.3  /  Alb  2.8<L>  /  TBili  0.4  /  DBili  x   /  AST  17  /  ALT  24  /  AlkPhos  88        Urinalysis Basic - ( 2020 15:26 )    Color: Yellow / Appearance: Clear / S.010 / pH: x  Gluc: x / Ketone: Negative  / Bili: Negative / Urobili: Negative   Blood: x / Protein: 100 / Nitrite: Negative   Leuk Esterase: Small / RBC: 0-2 /HPF / WBC 26-50 /HPF   Sq Epi: x / Non Sq Epi: Few /HPF / Bacteria: TNTC /HPF        CARDIAC MARKERS ( 2020 13:09 )  <0.015 ng/mL / x     / x     / x     / x                Procalcitonin, Serum: 3.87 ng/mL (01-10-20 @ 02:51)      MICROBIOLOGY: (if applicable)    RADIOLOGY & ADDITIONAL STUDIES:  CT Chest:  < from: CT Angio Chest w/ IV Cont (20 @ 15:43) >    EXAM:  CT ANGIO CHEST (W)AW IC                            PROCEDURE DATE:  2020          INTERPRETATION:  CLINICAL INFORMATION: Cough and chest pain    COMPARISON: None.    PROCEDURE:   CT Angiography of the Chest.  60 ml of Omnipaque 350 wasinjected intravenously. 40 ml were discarded.  Sagittal and coronal reformats were performed as well as 3D (MIP) reconstructions.      FINDINGS:    LUNGS AND AIRWAYS: Patent central airways.  Right lower lobe consolidation. Dependent and basilar atelectasis. Nodular density along the horizontal fissure, possibly lymph node.    PLEURA: Small right pleural effusion.    MEDIASTINUM AND CHAMP: Prominent mediastinal and right hilar lymph nodes.    VESSELS: Atherosclerotic changes. No thoracic aortic aneurysm or dissection. Linear filling defect within the middle lobe branch of the right pulmonary artery, possibly chronic embolus or web. Otherwise, no acute appearing pulmonary arterial filling defects.    HEART: Heart size is normal. No pericardial effusion.    CHEST WALL AND LOWER NECK: Within normal limits.    VISUALIZED UPPER ABDOMEN: Partially imaged right upper pole renal mass measuring 3.7 cm or partially imaged prominent congenital lobulation.    BONES: Degenerative changes.    IMPRESSION:     1. 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  2. Otherwise, no evidence of acute pulmonary emboli.  3. Right lower lobe pneumonia.  4. Partially imaged right upper pole renal mass or prominent congenital lobulation. Recommend renal ultrasound for further assessment.        CXR: < from: Xray Chest 2 Views PA/Lat (20 @ 15:51) >  PROCEDURE DATE:  2020          INTERPRETATION:  Frontal and lateral chest on 2020 3:41 PM. Patient has cough and weakness.    Heart is normal for projection.    There is a right base infiltrate medially that is better seen on CAT scan of the same day.    IMPRESSION: Right base infiltrate.      < end of copied text >    ECHO:    IMPRESSION: 69y Female PAST MEDICAL & SURGICAL HISTORY:  Latent tuberculosis  Hyperlipidemia  HTN (hypertension)    Impression; 70 Y/O Female presented with cough with whitish sputum since 1 1/2 week which is getting worse. She had some blood on her sputum today.  She has been having low grade fever since same time period along with chills,  generalized weakness, decreased appetite, headache, back pain. Today when she went to [facility],  she felt like almost passing out and looked pale and clammy as per . Episode of SOB on exertion , Cough, Low grade Fever due to CXR showing Right lung Pneumonia and CT Chest with chronic Right Middle Lobe Pulmonary Embolus.       Suggestion;  O2 saturation 90% room air. Continue Oxygen supplementation 2L NC to keep O2 saturation >90%.   Oral hygiene care.   Continue antibiotic , need ID consult. BLD Cx.   Continue Lovenox SQ Twice daily.   Can benefit from incentive spirometer. Time of visit:    CHIEF COMPLAINT: Patient is a 69y old  Female who presents with a chief complaint of Cough, fever, (2020 21:54)      HPI:  Pt is a 68 y/o F with PMH of HTN, HLD, Latent TB PSH of rt. knee surgery who presented with cough, runny nose, weakness and fever since 1 and half week.  According to  the pt, she started developing cough with whitish sputum since 1 1/2 week which is getting worse. She had some blood on her sputum today.  She has been having low grade fever since same time period along with chills,  generalized weakness, decreased appetite, headache, back pain. Today when she went to [facility],  she felt like almost passing out and looked pale and clammy as per .  She also complained of burning sensation while passing urine  and occasional urinary incontinence. (2020 21:54)   Patient seen and examined.     PAST MEDICAL & SURGICAL HISTORY:  Latent tuberculosis  Hyperlipidemia  HTN (hypertension)      Allergies    sulfa drugs (Rash)  Xylocaine 10% Oral (Short breath)    Intolerances        MEDICATIONS  (STANDING):  azithromycin  IVPB 500 milliGRAM(s) IV Intermittent every 24 hours  cefTRIAXone   IVPB 1000 milliGRAM(s) IV Intermittent every 24 hours  enoxaparin Injectable 70 milliGRAM(s) SubCutaneous two times a day  influenza   Vaccine 0.5 milliLiter(s) IntraMuscular once  simvastatin 40 milliGRAM(s) Oral at bedtime  venlafaxine XR. 150 milliGRAM(s) Oral daily      MEDICATIONS  (PRN):  acetaminophen   Tablet .. 650 milliGRAM(s) Oral every 6 hours PRN Mild Pain (1 - 3)  guaiFENesin   Syrup  (Sugar-Free) 100 milliGRAM(s) Oral every 6 hours PRN Cough   Medications up to date at time of exam.    Medications up to date at time of exam.    FAMILY HISTORY:      SOCIAL HISTORY  Smoking History: Denies smoking exposure.   Living Condition: [   ] apartment, [   ] private house  Work History:   Travel History: denies recent travel  Illicit Substance Use: denies  Alcohol Use: denies    REVIEW OF SYSTEMS:    CONSTITUTIONAL:  Episode of  fevers but Afebrile on exam . Episode of  chills yesterday . denies night  sweats.     HEENT:  denies diplopia or blurred vision, sore throat or runny nose.    CARDIOVASCULAR:  denies pressure, squeezing, tightness, or heaviness about the chest; no palpitations.    RESPIRATORY:  Verbalized of episodic SOB on exertion. Has dry non productive cough Denies LEGGETT. no wheezing.    GASTROINTESTINAL:  denies abdominal pain, nausea, vomiting or diarrhea.    GENITOURINARY: denies dysuria, frequency or urgency.    NEUROLOGIC:  denies numbness, tingling, seizures or weakness.    PSYCHIATRIC:  denies disorder of thought or mood.    MSK: denies swelling, redness      PHYSICAL EXAMINATION:    GENERAL: Alert and oriented. No acute distress. Able to answer question with no SOB.      Vital Signs Last 24 Hrs  T(C): 36.9 (10 Richie 2020 14:08), Max: 37.1 (2020 15:56)  T(F): 98.4 (10 Richie 2020 14:08), Max: 98.7 (2020 15:56)  HR: 93 (10 Richie 2020 14:08) (93 - 123)  BP: 127/63 (10 Richie 2020 14:08) (125/67 - 152/88)  BP(mean): --  RR: 18 (10 Richie 2020 14:08) (18 - 18)  SpO2: 98% (10 Richie 2020 14:08) (93% - 98%)   (if applicable)        HEENT: Head is normocephalic and atraumatic. No nasal tenderness . Extraocular muscles are intact. Mucous membranes are moist.     NECK: Supple, no palpable adenopathy.    LUNGS: Clear to auscultation, no wheezing, rales, or rhonchi. No use of accessory muscle.     HEART: S1 S2 Regular rate and no click/ rub.     ABDOMEN: Soft, nontender, and nondistended.  No abdominal guarding .     EXTREMITIES: Without any cyanosis, clubbing, rash, lesions or edema.    NEUROLOGIC: Awake, alert, oriented.     SKIN: Warm and moist. Non diaphoretic.        LABS:                        12.2   26.16 )-----------( 286      ( 10 Richie 2020 08:16 )             37.6     01-10    137  |  103  |  21<H>  ----------------------------<  111<H>  3.8   |  23  |  1.02    Ca    8.5      10 Richie 2020 08:16  Phos  3.6     01-10  Mg     2.4     01-10    TPro  6.3  /  Alb  2.8<L>  /  TBili  0.4  /  DBili  x   /  AST  17  /  ALT  24  /  AlkPhos  88        Urinalysis Basic - ( 2020 15:26 )    Color: Yellow / Appearance: Clear / S.010 / pH: x  Gluc: x / Ketone: Negative  / Bili: Negative / Urobili: Negative   Blood: x / Protein: 100 / Nitrite: Negative   Leuk Esterase: Small / RBC: 0-2 /HPF / WBC 26-50 /HPF   Sq Epi: x / Non Sq Epi: Few /HPF / Bacteria: TNTC /HPF        CARDIAC MARKERS ( 2020 13:09 )  <0.015 ng/mL / x     / x     / x     / x                Procalcitonin, Serum: 3.87 ng/mL (01-10-20 @ 02:51)      MICROBIOLOGY: (if applicable)    RADIOLOGY & ADDITIONAL STUDIES:  CT Chest:  < from: CT Angio Chest w/ IV Cont (20 @ 15:43) >    EXAM:  CT ANGIO CHEST (W)AW IC                            PROCEDURE DATE:  2020          INTERPRETATION:  CLINICAL INFORMATION: Cough and chest pain    COMPARISON: None.    PROCEDURE:   CT Angiography of the Chest.  60 ml of Omnipaque 350 wasinjected intravenously. 40 ml were discarded.  Sagittal and coronal reformats were performed as well as 3D (MIP) reconstructions.      FINDINGS:    LUNGS AND AIRWAYS: Patent central airways.  Right lower lobe consolidation. Dependent and basilar atelectasis. Nodular density along the horizontal fissure, possibly lymph node.    PLEURA: Small right pleural effusion.    MEDIASTINUM AND CHAMP: Prominent mediastinal and right hilar lymph nodes.    VESSELS: Atherosclerotic changes. No thoracic aortic aneurysm or dissection. Linear filling defect within the middle lobe branch of the right pulmonary artery, possibly chronic embolus or web. Otherwise, no acute appearing pulmonary arterial filling defects.    HEART: Heart size is normal. No pericardial effusion.    CHEST WALL AND LOWER NECK: Within normal limits.    VISUALIZED UPPER ABDOMEN: Partially imaged right upper pole renal mass measuring 3.7 cm or partially imaged prominent congenital lobulation.    BONES: Degenerative changes.    IMPRESSION:     1. 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  2. Otherwise, no evidence of acute pulmonary emboli.  3. Right lower lobe pneumonia.  4. Partially imaged right upper pole renal mass or prominent congenital lobulation. Recommend renal ultrasound for further assessment.        CXR: < from: Xray Chest 2 Views PA/Lat (20 @ 15:51) >  PROCEDURE DATE:  2020          INTERPRETATION:  Frontal and lateral chest on 2020 3:41 PM. Patient has cough and weakness.    Heart is normal for projection.    There is a right base infiltrate medially that is better seen on CAT scan of the same day.    IMPRESSION: Right base infiltrate.      < end of copied text >    ECHO:    IMPRESSION: 69y Female PAST MEDICAL & SURGICAL HISTORY:  Latent tuberculosis  Hyperlipidemia  HTN (hypertension)    Impression; 70 Y/O Female presented with cough with whitish sputum since 1 1/2 week which is getting worse. She had some blood on her sputum today.  She has been having low grade fever since same time period along with chills,  generalized weakness, decreased appetite, headache, back pain. Today when she went to [facility],  she felt like almost passing out and looked pale and clammy as per . Episode of SOB on exertion , Cough, Low grade Fever due to CXR showing Right lung Pneumonia and CT Chest with chronic Right Middle Lobe Pulmonary Embolus.       Suggestion;  O2 saturation 90% room air. Continue Oxygen supplementation 2L NC to keep O2 saturation >90%.   Oral hygiene care.   Continue antibiotic   2Decho  Continue Lovenox SQ Twice daily.   pain control

## 2020-01-10 NOTE — DISCHARGE NOTE PROVIDER - PROVIDER TOKENS
FREE:[LAST:[Jackson],FIRST:[Shon],PHONE:[(   )    -],FAX:[(   )    -],ADDRESS:[PMD in [facility]],FOLLOWUP:[2 weeks]]

## 2020-01-10 NOTE — CONSULT NOTE ADULT - ASSESSMENT
Pneumonia(CAP)  UTI  Leukocytosis  Fevers at home    Plan - Cont Rocephin 1 gm iv q24hrs  Cont Azithromycin 500mgs iv q24hrs  await culture results.

## 2020-01-10 NOTE — CONSULT NOTE ADULT - SUBJECTIVE AND OBJECTIVE BOX
HPI:  Pt is a 70 y/o F with PMH of HTN, HLD, Latent TB PSH of rt. knee surgery who presented with cough, runny nose, weakness and fever since 1 and half week.  According to  the pt, she started developing cough with whitish sputum since 1 1/2 week which is getting worse. She had some blood on her sputum today.  She has been having low grade fever since same time period along with chills,  generalized weakness, decreased appetite, headache, back pain. Today when she went to [facility],  she felt like almost passing out and looked pale and clammy as per .  She also complained of burning sensation while passing urine  and occasional urinary incontinence. (2020 21:54)      PAST MEDICAL & SURGICAL HISTORY:  Latent tuberculosis  Hyperlipidemia  HTN (hypertension)      sulfa drugs (Rash)  Xylocaine 10% Oral (Short breath)      Meds:  acetaminophen   Tablet .. 650 milliGRAM(s) Oral every 6 hours PRN  azithromycin  IVPB 500 milliGRAM(s) IV Intermittent every 24 hours  cefTRIAXone   IVPB 1000 milliGRAM(s) IV Intermittent every 24 hours  enoxaparin Injectable 70 milliGRAM(s) SubCutaneous two times a day  guaiFENesin   Syrup  (Sugar-Free) 100 milliGRAM(s) Oral every 6 hours PRN  influenza   Vaccine 0.5 milliLiter(s) IntraMuscular once  simvastatin 40 milliGRAM(s) Oral at bedtime  venlafaxine XR. 150 milliGRAM(s) Oral daily      SOCIAL HISTORY:  Smoker:  YES / NO        PACK YEARS:                         WHEN QUIT?  ETOH use:  YES / NO               FREQUENCY / QUANTITY:  Ilicit Drug use:  YES / NO  Occupation:  Assisted device use (Cane / Walker):  Live with:    FAMILY HISTORY:      VITALS:  Vital Signs Last 24 Hrs  T(C): 36.9 (10 Richie 2020 14:08), Max: 37.1 (2020 15:56)  T(F): 98.4 (10 Richie 2020 14:08), Max: 98.7 (2020 15:56)  HR: 93 (10 Richie 2020 14:08) (93 - 123)  BP: 127/63 (10 Richie 2020 14:08) (125/67 - 152/88)  BP(mean): --  RR: 18 (10 Richie 2020 14:08) (18 - 18)  SpO2: 98% (10 Richie 2020 14:08) (93% - 98%)    LABS/DIAGNOSTIC TESTS:                          12.2   26.16 )-----------( 286      ( 10 Richie 2020 08:16 )             37.6     WBC Count: 26.16 K/uL (01-10 @ 08:16)  WBC Count: 20.46 K/uL ( @ 20:59)  WBC Count: 25.84 K/uL ( @ 13:09)      01-10    137  |  103  |  21<H>  ----------------------------<  111<H>  3.8   |  23  |  1.02    Ca    8.5      10 Richie 2020 08:16  Phos  3.6     01-10  Mg     2.4     01-10    TPro  6.3  /  Alb  2.8<L>  /  TBili  0.4  /  DBili  x   /  AST  17  /  ALT  24  /  AlkPhos  88        Urinalysis Basic - ( 2020 15:26 )    Color: Yellow / Appearance: Clear / S.010 / pH: x  Gluc: x / Ketone: Negative  / Bili: Negative / Urobili: Negative   Blood: x / Protein: 100 / Nitrite: Negative   Leuk Esterase: Small / RBC: 0-2 /HPF / WBC 26-50 /HPF   Sq Epi: x / Non Sq Epi: Few /HPF / Bacteria: TNTC /HPF        LIVER FUNCTIONS - ( 2020 20:59 )  Alb: 2.8 g/dL / Pro: 6.3 g/dL / ALK PHOS: 88 U/L / ALT: 24 U/L DA / AST: 17 U/L / GGT: x                 LACTATE:    ABG -     CULTURES:       RADIOLOGY:< from: Xray Chest 2 Views PA/Lat (20 @ 15:51) >  EXAM:  XR CHEST PA LAT 2V                            PROCEDURE DATE:  2020          INTERPRETATION:  Frontal and lateral chest on 2020 3:41 PM. Patient has cough and weakness.    Heart is normal for projection.    There is a right base infiltrate medially that is better seen on CAT scan of the same day.    IMPRESSION: Right base infiltrate.                DAO ISRAEL M.D., ATTENDING RADIOLOGIST  This document has been electronically signed. 2020  3:58PM          < end of copied text >        ROS  [  ] UNABLE TO ELICIT HPI:  Pt is a 68 y/o F with PMH of HTN, HLD, Latent TB PSH of rt. knee surgery who presented with cough, runny nose, weakness and fever since 1 and half week.  According to  the pt, she started developing cough with whitish sputum since 1 1/2 week which is getting worse. She had some blood on her sputum today.  She has been having low grade fever since same time period along with chills,  generalized weakness, decreased appetite, headache, back pain. Today when she went to [facility],  she felt like almost passing out and looked pale and clammy as per .  She also complained of burning sensation while passing urine  and occasional urinary incontinence. (2020 21:54)    History as above, pt who  presented with a 1 week history of cough with whitish sputum which is blood tinged along with dysuria and increased frequency of urination along with chills and fevers, here she was found to have a right lower lobe pneumonia and a UTI and has been started on Rocephin and Azithromycin for both. She is looking and feeling ill currently, she denies any chest pain , N,V or diarrhea.      PAST MEDICAL & SURGICAL HISTORY:  Latent tuberculosis  Hyperlipidemia  HTN (hypertension)      sulfa drugs (Rash)  Xylocaine 10% Oral (Short breath)      Meds:  acetaminophen   Tablet .. 650 milliGRAM(s) Oral every 6 hours PRN  azithromycin  IVPB 500 milliGRAM(s) IV Intermittent every 24 hours  cefTRIAXone   IVPB 1000 milliGRAM(s) IV Intermittent every 24 hours  enoxaparin Injectable 70 milliGRAM(s) SubCutaneous two times a day  guaiFENesin   Syrup  (Sugar-Free) 100 milliGRAM(s) Oral every 6 hours PRN  influenza   Vaccine 0.5 milliLiter(s) IntraMuscular once  simvastatin 40 milliGRAM(s) Oral at bedtime  venlafaxine XR. 150 milliGRAM(s) Oral daily      SOCIAL HISTORY:  Smoker:  no  ETOH use:  no    FAMILY HISTORY: not contributory      VITALS:  Vital Signs Last 24 Hrs  T(C): 36.9 (10 Richie 2020 14:08), Max: 37.1 (2020 15:56)  T(F): 98.4 (10 Richie 2020 14:08), Max: 98.7 (2020 15:56)  HR: 93 (10 Richie 2020 14:08) (93 - 123)  BP: 127/63 (10 Richie 2020 14:08) (125/67 - 152/88)  BP(mean): --  RR: 18 (10 Richie 2020 14:08) (18 - 18)  SpO2: 98% (10 Richie 2020 14:08) (93% - 98%)    LABS/DIAGNOSTIC TESTS:                          12.2   26.16 )-----------( 286      ( 10 Richie 2020 08:16 )             37.6     WBC Count: 26.16 K/uL (01-10 @ 08:16)  WBC Count: 20.46 K/uL ( @ 20:59)  WBC Count: 25.84 K/uL ( @ 13:09)      01-10    137  |  103  |  21<H>  ----------------------------<  111<H>  3.8   |  23  |  1.02    Ca    8.5      10 Richie 2020 08:16  Phos  3.6     01-10  Mg     2.4     01-10    TPro  6.3  /  Alb  2.8<L>  /  TBili  0.4  /  DBili  x   /  AST  17  /  ALT  24  /  AlkPhos  88        Urinalysis Basic - ( 2020 15:26 )    Color: Yellow / Appearance: Clear / S.010 / pH: x  Gluc: x / Ketone: Negative  / Bili: Negative / Urobili: Negative   Blood: x / Protein: 100 / Nitrite: Negative   Leuk Esterase: Small / RBC: 0-2 /HPF / WBC 26-50 /HPF   Sq Epi: x / Non Sq Epi: Few /HPF / Bacteria: TNTC /HPF        LIVER FUNCTIONS - ( 2020 20:59 )  Alb: 2.8 g/dL / Pro: 6.3 g/dL / ALK PHOS: 88 U/L / ALT: 24 U/L DA / AST: 17 U/L / GGT: x                 LACTATE:    ABG -     CULTURES:       RADIOLOGY:< from: Xray Chest 2 Views PA/Lat (20 @ 15:51) >  EXAM:  XR CHEST PA LAT 2V                            PROCEDURE DATE:  2020          INTERPRETATION:  Frontal and lateral chest on 2020 3:41 PM. Patient has cough and weakness.    Heart is normal for projection.    There is a right base infiltrate medially that is better seen on CAT scan of the same day.    IMPRESSION: Right base infiltrate.                DAO ISRAEL M.D., ATTENDING RADIOLOGIST  This document has been electronically signed. 2020  3:58PM          < end of copied text >        ROS  [  ] UNABLE TO ELICIT

## 2020-01-10 NOTE — DISCHARGE NOTE PROVIDER - HOSPITAL COURSE
69 year old Woman with hx of HTN, HLD, Latent TB PSH of Right knee surgery who presented on 1/9/2020 with c/o 10 day hx of cough, runny nose, weakness, chills, fever with blood tinged sputum same day of ED arrival. Found to have RLL PNA; Pt admitted for CAP and CT angio with possible chronic PE             Pt is a 68 y/o F with PMH of HTN, HLD, Latent TB PSH of rt. knee surgery who presented with cough, runny nose, weakness and fever since 1 and half week. In ED, CTA chest showed signs of right lower lobe  PNA. Labs, Blood culture, Urine Cx was sent & pt was started on Ceftriaxone and Azithromycin. Pt is being admitted for CAP & Urinary tract infection.        # CTA chest shows chronic right middle lobe PE. Pt was started on full dose of Lovenox and pulmonary, Dr Lindsey, was consulted.                # On CTA chest a right upper pole renal mass or prominent congenital lobulation was noted.  Urology was consulted Pt is a 70 y/o F with PMH of HTN, HLD, Latent TB PSH of rt. knee surgery who presented with cough, runny nose, weakness and fever since 1 and half week. In ED, CTA chest showed signs of right lower lobe  PNA. Labs, Blood culture, Urine Cx was sent & pt was started on Ceftriaxone and Azithromycin. Pt is being admitted for CAP & Urinary tract infection.        # CTA chest shows chronic right middle lobe PE. Pt was started on full dose of Lovenox and pulmonologist, Dr Lindsey, was consulted.                # On CTA chest a right upper pole renal mass or prominent congenital lobulation was noted.  Urology was consulted Pt is a 68 y/o F with PMH of HTN, HLD, Latent TB PSH of rt. knee surgery who presented with cough, runny nose, weakness and fever since 1 and half week. In ED, CTA chest showed signs of right lower lobe  PNA. Labs, Blood culture, Urine Cx was sent & pt was started on Ceftriaxone and Azithromycin. Pt is being admitted for CAP & Urinary tract infection.        # CTA chest shows chronic right middle lobe PE. Pt was started on full dose of Lovenox and pulmonologist, Dr Lindsey, was consulted who recommended to have VQ scan to confirm --- xxxx                # On CTA chest a right upper pole renal mass or prominent congenital lobulation was noted.  Urology was consulted who recommended to have CT scan of A/P with/ without contrast - xxxxx            Treated with IV ceftriaxone and zithromax for community acquired pneumonia, ID Dr. Chino was consulted, Ecoli positive UTI, treated well on IV ABT.                 NOT COMPLETE Pt is a 70 y/o F with PMH of HTN, HLD, Latent TB PSH of rt. knee surgery who presented with cough, runny nose, weakness and fever since 1 and half week. In ED, CTA chest showed signs of right lower lobe  PNA. Labs, Blood culture, Urine Cx was sent & pt was started on Ceftriaxone and Azithromycin. Pt is being admitted for CAP & Urinary tract infection.        # CTA chest shows chronic right middle lobe PE. Pt was started on full dose of Lovenox and pulmonologist, Dr Lindsey, was consulted who recommended to have VQ scan to confirm, very low probability of PE, d/soha lovenox                  # On CTA chest a right upper pole renal mass or prominent congenital lobulation was noted.  Urology was consulted who recommended to have CT scan of A/P with/ without contrast - showed renal carcinoma, urology recommended treat pneumonia first then will discuss about surgical option when pt's condition is optimized for surgery. Follow-up as outpt with either Dr. Angela or with a urologist in [place] where she lives.            Treated with IV ceftriaxone and zithromax for community acquired pneumonia, ID Dr. Chino was consulted, Ecoli positive UTI, treated well on IV ABT.                 NOT COMPLETE Pt is a 70 y/o F with PMH of HTN, HLD, Latent TB PSH of rt. knee surgery who presented with cough, runny nose, weakness and fever since 1 and half week. In ED, CTA chest showed signs of right lower lobe  PNA. Labs, Blood culture, Urine Cx was sent & pt was started on Ceftriaxone and Azithromycin. Pt is being admitted for CAP & Urinary tract infection.        # CTA chest shows chronic right middle lobe PE. Pt was started on full dose of Lovenox and pulmonologist, Dr Lindsey, was consulted who recommended to have VQ scan to confirm, very low probability of PE, d/soha lovenox                  # On CTA chest a right upper pole renal mass or prominent congenital lobulation was noted.  Urology was consulted who recommended to have CT scan of A/P with/ without contrast - showed renal carcinoma, urology recommended treat pneumonia first then will discuss about surgical option when pt's condition is optimized for surgery. Follow-up as outpt with either Dr. Angela or with a urologist in [place] where she lives.            Treated with IV ceftriaxone and zithromax for community acquired pneumonia, ID Dr. Chino was consulted, Ecoli positive UTI, treated well on IV ABT.         Patient satting well on room air.            NOT COMPLETE Pt is a 70 y/o F with PMH of HTN, HLD, Latent TB PSH of rt. knee surgery who presented with cough, runny nose, weakness and fever since 1 and half week. In ED, CTA chest showed signs of right lower lobe  PNA. Labs, Blood culture, Urine Cx was sent & pt was started on Ceftriaxone and Azithromycin. Pt is being admitted for CAP & Urinary tract infection.        # CTA chest shows chronic right middle lobe PE. Pt was started on full dose of Lovenox and pulmonologist, Dr Lindsey, was consulted who recommended to have VQ scan to confirm, very low probability of PE, d/soha lovenox                  # On CTA chest a right upper pole renal mass or prominent congenital lobulation was noted.  Urology was consulted who recommended to have CT scan of A/P with/ without contrast - showed renal carcinoma, urology recommended treat pneumonia first then will discuss about surgical option when pt's condition is optimized for surgery. Follow-up as outpt with either Dr. Angela or with a urologist in [place] where she lives.            Treated with IV ceftriaxone and zithromax for community acquired pneumonia, ID Dr. Chino was consulted, Ecoli positive UTI, treated well on IV ABT. Completed course of antibiotic during this hospital stay.         Patient satting well on room air. Pt is medically stable for discharge. Pt is a 68 y/o F with PMH of HTN, HLD, Latent TB PSH of rt. knee surgery who presented with cough, runny nose, weakness and fever since 1 and half week. In ED, CTA chest showed signs of right lower lobe  PNA. Labs, Blood culture, Urine Cx was sent & pt was started on Ceftriaxone and Azithromycin. Pt is being admitted for CAP & Urinary tract infection.        # CTA chest shows chronic right middle lobe PE. Pt was started on full dose of Lovenox and pulmonologist, Dr Lindsey, was consulted who recommended to have VQ scan to confirm, very low probability of PE, d/soha lovenox                  # On CTA chest a right upper pole renal mass or prominent congenital lobulation was noted.  Urology was consulted who recommended to have CT scan of A/P with/ without contrast - showed renal carcinoma, urology recommended treat pneumonia first then will discuss about surgical option when pt's condition is optimized for surgery. Follow-up as outpt with either Dr. Angela or with a urologist in [place] where she lives.            Treated with IV ceftriaxone and zithromax for community acquired pneumonia, ID Dr. Chino was consulted, Ecoli positive UTI, treated well on IV ABT. Completed course of antibiotic during this hospital stay.         Patient satting well on room air. Pt is medically stable for discharge.         Since pt lives in [facility] and will stays in [place] for another week or two, recommended to see internist at [address] [place] [address] within 1 week from di/c

## 2020-01-10 NOTE — PROGRESS NOTE ADULT - ASSESSMENT
seen and examined  vsstable afebrile physical unchaged  no cp or sob or palp.  bm ok no rectal bleed    feeling 30-40 better  cough, sob both better  but still has bout of cough.  no cp or palp.  apetite on low side  vsstbale afebrile  lungs rt lower lobe rales    cns  non focal.  labs wbc still is 26.   renal functions ok   d/w pulm  asper pulm cont sq lovenox    pneumonia  d/w ID  want to cont iv rocephine and zithro.  influ A, B neg  cont

## 2020-01-10 NOTE — PROGRESS NOTE ADULT - PROBLEM SELECTOR PLAN 3
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 Lindsey on board  will need to finalize appropriate AC for discharge

## 2020-01-10 NOTE — PROGRESS NOTE ADULT - SUBJECTIVE AND OBJECTIVE BOX
HPI:  Pt is a 70 y/o F with PMH of HTN, HLD, Latent TB PSH of rt. knee surgery who presented with cough, runny nose, weakness and fever since 1 and half week.  According to  the pt, she started developing cough with whitish sputum since 1 1/2 week which is getting worse. She had some blood on her sputum today.  She has been having low grade fever since same time period along with chills,  generalized weakness, decreased appetite, headache, back pain. Today when she went to [facility],  she felt like almost passing out and looked pale and clammy as per .  She also complained of burning sensation while passing urine  and occasional urinary incontinence. (2020 21:54)      Patient is a 69y old  Female who presents with a chief complaint of Cough, fever, (10 Richie 2020 16:40)      INTERVAL HPI/OVERNIGHT EVENTS:  T(C): 36.9 (01-10-20 @ 14:08), Max: 37 (20 @ 20:17)  HR: 93 (01-10-20 @ 14:08) (93 - 123)  BP: 127/63 (01-10-20 @ 14:08) (125/67 - 152/88)  RR: 18 (01-10-20 @ 14:08) (18 - 18)  SpO2: 98% (01-10-20 @ 14:08) (93% - 98%)  Wt(kg): --  I&O's Summary      REVIEW OF SYSTEMS: denies fever, chills, SOB, palpitations, chest pain, abdominal pain, nausea, vomitting, diarrhea, constipation, dizziness    MEDICATIONS  (STANDING):  azithromycin  IVPB 500 milliGRAM(s) IV Intermittent every 24 hours  cefTRIAXone   IVPB 1000 milliGRAM(s) IV Intermittent every 24 hours  enoxaparin Injectable 70 milliGRAM(s) SubCutaneous two times a day  influenza   Vaccine 0.5 milliLiter(s) IntraMuscular once  simvastatin 40 milliGRAM(s) Oral at bedtime  venlafaxine XR. 150 milliGRAM(s) Oral daily    MEDICATIONS  (PRN):  acetaminophen   Tablet .. 650 milliGRAM(s) Oral every 6 hours PRN Mild Pain (1 - 3)  guaiFENesin   Syrup  (Sugar-Free) 100 milliGRAM(s) Oral every 6 hours PRN Cough  ipratropium    for Nebulization 500 MICROGram(s) Nebulizer every 6 hours PRN Shortness of Breath and/or Wheezing      PHYSICAL EXAM:  GENERAL: NAD, well-groomed, well-developed  HEAD:  Atraumatic, Normocephalic  EYES: EOMI, PERRLA, conjunctiva and sclera clear  ENMT: No tonsillar erythema, exudates, or enlargement; Moist mucous membranes, Good dentition, No lesions  NECK: Supple, No JVD, Normal thyroid  NERVOUS SYSTEM:  Alert & Oriented X3, Good concentration; Motor Strength 5/5 B/L upper and lower extremities; DTRs 2+ intact and symmetric  CHEST/LUNG: Clear to percussion bilaterally; No rales, rhonchi, wheezing, or rubs  HEART: Regular rate and rhythm; No murmurs, rubs, or gallops  ABDOMEN: Soft, Nontender, Nondistended; Bowel sounds present  EXTREMITIES:  2+ Peripheral Pulses, No clubbing, cyanosis, or edema  LYMPH: No lymphadenopathy noted  SKIN: No rashes or lesions  LABS:                        12.2   26.16 )-----------( 286      ( 10 Richie 2020 08:16 )             37.6     01-10    137  |  103  |  21<H>  ----------------------------<  111<H>  3.8   |  23  |  1.02    Ca    8.5      10 Richie 2020 08:16  Phos  3.6     01-10  Mg     2.4     01-10    TPro  6.3  /  Alb  2.8<L>  /  TBili  0.4  /  DBili  x   /  AST  17  /  ALT  24  /  AlkPhos  88  01-09      Urinalysis Basic - ( 2020 15:26 )    Color: Yellow / Appearance: Clear / S.010 / pH: x  Gluc: x / Ketone: Negative  / Bili: Negative / Urobili: Negative   Blood: x / Protein: 100 / Nitrite: Negative   Leuk Esterase: Small / RBC: 0-2 /HPF / WBC 26-50 /HPF   Sq Epi: x / Non Sq Epi: Few /HPF / Bacteria: TNTC /HPF      CAPILLARY BLOOD GLUCOSE            Urinalysis Basic - ( 2020 15:26 )    Color: Yellow / Appearance: Clear / S.010 / pH: x  Gluc: x / Ketone: Negative  / Bili: Negative / Urobili: Negative   Blood: x / Protein: 100 / Nitrite: Negative   Leuk Esterase: Small / RBC: 0-2 /HPF / WBC 26-50 /HPF   Sq Epi: x / Non Sq Epi: Few /HPF / Bacteria: TNTC /HPF

## 2020-01-10 NOTE — PROGRESS NOTE ADULT - PROBLEM SELECTOR PLAN 4
Right upper pole renal mass or prominent congenital lobulation was noted on CTA chest.  Urology consult this adm vs outpt follow up

## 2020-01-10 NOTE — DISCHARGE NOTE PROVIDER - NSDCCPCAREPLAN_GEN_ALL_CORE_FT
PRINCIPAL DISCHARGE DIAGNOSIS  Diagnosis: Pneumonia due to infectious organism, unspecified laterality, unspecified part of lung  Assessment and Plan of Treatment: Pneumonia is a lung infection that can cause a fever, cough, and trouble breathing.  Continue all antibiotics as ordered until complete.  Nutrition is important, eat small frequent meals.  Get lots of rest and drink fluids.  Call your health care provider upon arrival home from hospital and make a follow up appointment for one week.  If your cough worsens, you develop fever greater than 101', you have shaking chills, a fast heartbeat, trouble breathing and/or feel your are breathing much faster than usual, call your healthcare provider.  Make sure you wash your hands frequently.        SECONDARY DISCHARGE DIAGNOSES  Diagnosis: Renal carcinoma  Assessment and Plan of Treatment:       Diagnosis: UTI (urinary tract infection)  Assessment and Plan of Treatment: HOME CARE INSTRUCTIONS  If you were prescribed antibiotics, take them exactly as your caregiver instructs you. Finish the medication even if you feel better after you have only taken some of the medication.  Drink enough water and fluids to keep your urine clear or pale yellow.  Avoid caffeine, tea, and carbonated beverages. They tend to irritate your bladder.  Empty your bladder often. Avoid holding urine for long periods of time.  Empty your bladder before and after sexual intercourse.  After a bowel movement, women should cleanse from front to back. Use each tissue only once.  SEEK MEDICAL CARE IF:  You have back pain.  You develop a fever.  Your symptoms do not begin to resolve within 3 days.  SEEK IMMEDIATE MEDICAL CARE IF:  You have severe back pain or lower abdominal pain.  You develop chills.  You have nausea or vomiting.  You have continued burning or discomfort with urination. PRINCIPAL DISCHARGE DIAGNOSIS  Diagnosis: Pneumonia due to infectious organism, unspecified laterality, unspecified part of lung  Assessment and Plan of Treatment: Pneumonia is a lung infection that can cause a fever, cough, and trouble breathing.  Continue all antibiotics as ordered until complete.  Nutrition is important, eat small frequent meals.  Get lots of rest and drink fluids.  Call your health care provider upon arrival home from hospital and make a follow up appointment for one week.  If your cough worsens, you develop fever greater than 101', you have shaking chills, a fast heartbeat, trouble breathing and/or feel your are breathing much faster than usual, call your healthcare provider.  Make sure you wash your hands frequently.        SECONDARY DISCHARGE DIAGNOSES  Diagnosis: Renal carcinoma  Assessment and Plan of Treatment: It was found from CT scan of Abdominal and pelvis.   Urology consulted and explained possible options regarding this matter. As you live in [facility] you can find specialist in your home town and can have furhter discussion. If you have hematuria, or unrelieving pain to  Right lower back, flank pain with pain medication please seek for medical care.    Diagnosis: UTI (urinary tract infection)  Assessment and Plan of Treatment: HOME CARE INSTRUCTIONS  Completed course of antibiotic for UTI  Drink enough water and fluids to keep your urine clear or pale yellow.  Avoid caffeine, tea, and carbonated beverages. They tend to irritate your bladder.  Empty your bladder often. Avoid holding urine for long periods of time.  Empty your bladder before and after sexual intercourse.  After a bowel movement, women should cleanse from front to back. Use each tissue only once.  SEEK MEDICAL CARE IF:  You have back pain.  You develop a fever.  Your symptoms do not begin to resolve within 3 days.  SEEK IMMEDIATE MEDICAL CARE IF:  You have severe back pain or lower abdominal pain.  You develop chills.  You have nausea or vomiting.  You have continued burning or discomfort with urination.

## 2020-01-10 NOTE — PROGRESS NOTE ADULT - PROBLEM SELECTOR PLAN 5
Pt takes amlodipine and metoprolol at home  both held on admission in the setting of PNA  Monitor Blood pressure and resume if BP increases  TSH wnl

## 2020-01-10 NOTE — CONSULT NOTE ADULT - GASTROINTESTINAL DETAILS
soft/no rigidity/nontender/no rebound tenderness/bowel sounds normal/no organomegaly/no distention/no masses palpable/no guarding

## 2020-01-10 NOTE — PROGRESS NOTE ADULT - PROBLEM SELECTOR PLAN 1
CTA chest showed signs of right lower lobe  PNA  Continue Ceftriaxone and Azithromycin  F/u Blood culture.  ID: Dr Oliveira  supplemental O2 / incentive spirometer  guiafessin PRN  atrovent PRN

## 2020-01-10 NOTE — DISCHARGE NOTE PROVIDER - NSFOLLOWUPCLINICS_GEN_ALL_ED_FT
[facility]  Internal Medicine  [address] [place] [address]  Phone: [phone]  Fax: [phone]  Follow Up Time: 1 week

## 2020-01-10 NOTE — DISCHARGE NOTE PROVIDER - NSDCMRMEDTOKEN_GEN_ALL_CORE_FT
amLODIPine 5 mg oral tablet: 1 tab(s) orally once a day  metoprolol succinate 50 mg oral tablet, extended release: 1 tab(s) orally once a day  simvastatin 40 mg oral tablet: 1 tab(s) orally once a day (at bedtime)  venlafaxine 150 mg oral capsule, extended release: 1 cap(s) orally once a day amLODIPine 5 mg oral tablet: 1 tab(s) orally once a day  Casi-Tussin Expectorant 100 mg/5 mL oral liquid: 10 milliliter(s) orally every 6 hours, As Needed -Cough   metoprolol succinate 50 mg oral tablet, extended release: 1 tab(s) orally once a day  simvastatin 40 mg oral tablet: 1 tab(s) orally once a day (at bedtime)  traMADol 50 mg oral tablet: 1 tab(s) orally 3 times a day MDD:3 tabs  venlafaxine 150 mg oral capsule, extended release: 1 cap(s) orally once a day

## 2020-01-10 NOTE — DISCHARGE NOTE PROVIDER - NSDCFUADDAPPT_GEN_ALL_CORE_FT
pulmonology in her home town -- pt will make an appointment at [address] [place] [address] if she stays more than a week in [place], If she goes home during the weekend, she will see pulmo and uro in her home town.

## 2020-01-11 LAB
ANION GAP SERPL CALC-SCNC: 9 MMOL/L — SIGNIFICANT CHANGE UP (ref 5–17)
BUN SERPL-MCNC: 36 MG/DL — HIGH (ref 7–18)
CALCIUM SERPL-MCNC: 8.2 MG/DL — LOW (ref 8.4–10.5)
CHLORIDE SERPL-SCNC: 108 MMOL/L — SIGNIFICANT CHANGE UP (ref 96–108)
CO2 SERPL-SCNC: 22 MMOL/L — SIGNIFICANT CHANGE UP (ref 22–31)
CREAT SERPL-MCNC: 1.3 MG/DL — SIGNIFICANT CHANGE UP (ref 0.5–1.3)
GLUCOSE SERPL-MCNC: 85 MG/DL — SIGNIFICANT CHANGE UP (ref 70–99)
HCT VFR BLD CALC: 35.2 % — SIGNIFICANT CHANGE UP (ref 34.5–45)
HGB BLD-MCNC: 11.1 G/DL — LOW (ref 11.5–15.5)
MAGNESIUM SERPL-MCNC: 2.5 MG/DL — SIGNIFICANT CHANGE UP (ref 1.6–2.6)
MCHC RBC-ENTMCNC: 27 PG — SIGNIFICANT CHANGE UP (ref 27–34)
MCHC RBC-ENTMCNC: 31.5 GM/DL — LOW (ref 32–36)
MCV RBC AUTO: 85.6 FL — SIGNIFICANT CHANGE UP (ref 80–100)
NRBC # BLD: 0 /100 WBCS — SIGNIFICANT CHANGE UP (ref 0–0)
PLATELET # BLD AUTO: 289 K/UL — SIGNIFICANT CHANGE UP (ref 150–400)
POTASSIUM SERPL-MCNC: 3.4 MMOL/L — LOW (ref 3.5–5.3)
POTASSIUM SERPL-SCNC: 3.4 MMOL/L — LOW (ref 3.5–5.3)
RBC # BLD: 4.11 M/UL — SIGNIFICANT CHANGE UP (ref 3.8–5.2)
RBC # FLD: 15.2 % — HIGH (ref 10.3–14.5)
SODIUM SERPL-SCNC: 139 MMOL/L — SIGNIFICANT CHANGE UP (ref 135–145)
WBC # BLD: 16.76 K/UL — HIGH (ref 3.8–10.5)
WBC # FLD AUTO: 16.76 K/UL — HIGH (ref 3.8–10.5)

## 2020-01-11 RX ORDER — POTASSIUM CHLORIDE 20 MEQ
20 PACKET (EA) ORAL
Refills: 0 | Status: COMPLETED | OUTPATIENT
Start: 2020-01-11 | End: 2020-01-11

## 2020-01-11 RX ADMIN — Medication 100 MILLIGRAM(S): at 18:58

## 2020-01-11 RX ADMIN — Medication 650 MILLIGRAM(S): at 11:44

## 2020-01-11 RX ADMIN — Medication 20 MILLIEQUIVALENT(S): at 17:03

## 2020-01-11 RX ADMIN — SIMVASTATIN 40 MILLIGRAM(S): 20 TABLET, FILM COATED ORAL at 21:25

## 2020-01-11 RX ADMIN — Medication 20 MILLIEQUIVALENT(S): at 21:25

## 2020-01-11 RX ADMIN — ENOXAPARIN SODIUM 70 MILLIGRAM(S): 100 INJECTION SUBCUTANEOUS at 17:01

## 2020-01-11 RX ADMIN — CEFTRIAXONE 100 MILLIGRAM(S): 500 INJECTION, POWDER, FOR SOLUTION INTRAMUSCULAR; INTRAVENOUS at 17:00

## 2020-01-11 RX ADMIN — ENOXAPARIN SODIUM 70 MILLIGRAM(S): 100 INJECTION SUBCUTANEOUS at 05:49

## 2020-01-11 RX ADMIN — Medication 100 MILLIGRAM(S): at 11:42

## 2020-01-11 RX ADMIN — Medication 650 MILLIGRAM(S): at 13:30

## 2020-01-11 RX ADMIN — AZITHROMYCIN 255 MILLIGRAM(S): 500 TABLET, FILM COATED ORAL at 17:01

## 2020-01-11 RX ADMIN — Medication 150 MILLIGRAM(S): at 11:42

## 2020-01-11 NOTE — PROGRESS NOTE ADULT - SUBJECTIVE AND OBJECTIVE BOX
HPI:  Pt is a 70 y/o F with PMH of HTN, HLD, Latent TB PSH of rt. knee surgery who presented with cough, runny nose, weakness and fever since 1 and half week.  According to  the pt, she started developing cough with whitish sputum since 1 1/2 week which is getting worse. She had some blood on her sputum today.  She has been having low grade fever since same time period along with chills,  generalized weakness, decreased appetite, headache, back pain. Today when she went to [facility],  she felt like almost passing out and looked pale and clammy as per .  She also complained of burning sensation while passing urine  and occasional urinary incontinence. (09 Jan 2020 21:54)      Patient is a 69y old  Female who presents with a chief complaint of Cough, fever, (10 Richie 2020 23:59)      INTERVAL HPI/OVERNIGHT EVENTS:  T(C): 36.8 (01-11-20 @ 14:44), Max: 36.8 (01-11-20 @ 14:44)  HR: 89 (01-11-20 @ 14:44) (82 - 89)  BP: 141/55 (01-11-20 @ 14:44) (101/68 - 141/55)  RR: 17 (01-11-20 @ 14:44) (17 - 20)  SpO2: 96% (01-11-20 @ 14:44) (96% - 96%)  Wt(kg): --  I&O's Summary      REVIEW OF SYSTEMS: denies fever, chills, SOB, palpitations, chest pain, abdominal pain, nausea, vomitting, diarrhea, constipation, dizziness    MEDICATIONS  (STANDING):  azithromycin  IVPB 500 milliGRAM(s) IV Intermittent every 24 hours  cefTRIAXone   IVPB 1000 milliGRAM(s) IV Intermittent every 24 hours  enoxaparin Injectable 70 milliGRAM(s) SubCutaneous two times a day  influenza   Vaccine 0.5 milliLiter(s) IntraMuscular once  potassium chloride    Tablet ER 20 milliEquivalent(s) Oral every 2 hours  simvastatin 40 milliGRAM(s) Oral at bedtime  venlafaxine XR. 150 milliGRAM(s) Oral daily    MEDICATIONS  (PRN):  acetaminophen   Tablet .. 650 milliGRAM(s) Oral every 6 hours PRN Mild Pain (1 - 3)  guaiFENesin   Syrup  (Sugar-Free) 100 milliGRAM(s) Oral every 6 hours PRN Cough  ipratropium    for Nebulization 500 MICROGram(s) Nebulizer every 6 hours PRN Shortness of Breath and/or Wheezing      PHYSICAL EXAM:  GENERAL: NAD, well-groomed, well-developed  HEAD:  Atraumatic, Normocephalic  EYES: EOMI, PERRLA, conjunctiva and sclera clear  ENMT: No tonsillar erythema, exudates, or enlargement; Moist mucous membranes, Good dentition, No lesions  NECK: Supple, No JVD, Normal thyroid  NERVOUS SYSTEM:  Alert & Oriented X3, Good concentration; Motor Strength 5/5 B/L upper and lower extremities; DTRs 2+ intact and symmetric  CHEST/LUNG: Clear to percussion bilaterally; No rales, rhonchi, wheezing, or rubs  HEART: Regular rate and rhythm; No murmurs, rubs, or gallops  ABDOMEN: Soft, Nontender, Nondistended; Bowel sounds present  EXTREMITIES:  2+ Peripheral Pulses, No clubbing, cyanosis, or edema  LYMPH: No lymphadenopathy noted  SKIN: No rashes or lesions  LABS:                        11.1   16.76 )-----------( 289      ( 11 Jan 2020 08:32 )             35.2     01-11    139  |  108  |  36<H>  ----------------------------<  85  3.4<L>   |  22  |  1.30    Ca    8.2<L>      11 Jan 2020 08:32  Phos  3.6     01-10  Mg     2.5     01-11    TPro  6.3  /  Alb  2.8<L>  /  TBili  0.4  /  DBili  x   /  AST  17  /  ALT  24  /  AlkPhos  88  01-09        CAPILLARY BLOOD GLUCOSE

## 2020-01-11 NOTE — PROGRESS NOTE ADULT - ASSESSMENT
seen and examined  vstable on chair comfortable with 2 liters nc   denies sob or palp cp  cough better but still and during cough rt sided cp.  vs stable afebrile physical unchaged   labs k 3.4wbc 26 to 16  a/p rt lower lobe pneumonia  and gram neg uti  on rocephine doing better on rocephine and zithro    also rt middle lobe has pulm embolism undetermined age  on lovenox   will get cxr  i d/w pulm  echo pending

## 2020-01-12 LAB
-  AMIKACIN: SIGNIFICANT CHANGE UP
-  AMPICILLIN/SULBACTAM: SIGNIFICANT CHANGE UP
-  AMPICILLIN: SIGNIFICANT CHANGE UP
-  AZTREONAM: SIGNIFICANT CHANGE UP
-  CEFAZOLIN: SIGNIFICANT CHANGE UP
-  CEFEPIME: SIGNIFICANT CHANGE UP
-  CEFOXITIN: SIGNIFICANT CHANGE UP
-  CEFTRIAXONE: SIGNIFICANT CHANGE UP
-  CIPROFLOXACIN: SIGNIFICANT CHANGE UP
-  GENTAMICIN: SIGNIFICANT CHANGE UP
-  IMIPENEM: SIGNIFICANT CHANGE UP
-  LEVOFLOXACIN: SIGNIFICANT CHANGE UP
-  MEROPENEM: SIGNIFICANT CHANGE UP
-  NITROFURANTOIN: SIGNIFICANT CHANGE UP
-  PIPERACILLIN/TAZOBACTAM: SIGNIFICANT CHANGE UP
-  TIGECYCLINE: SIGNIFICANT CHANGE UP
-  TOBRAMYCIN: SIGNIFICANT CHANGE UP
-  TRIMETHOPRIM/SULFAMETHOXAZOLE: SIGNIFICANT CHANGE UP
ANION GAP SERPL CALC-SCNC: 7 MMOL/L — SIGNIFICANT CHANGE UP (ref 5–17)
BUN SERPL-MCNC: 30 MG/DL — HIGH (ref 7–18)
CALCIUM SERPL-MCNC: 8.8 MG/DL — SIGNIFICANT CHANGE UP (ref 8.4–10.5)
CHLORIDE SERPL-SCNC: 110 MMOL/L — HIGH (ref 96–108)
CO2 SERPL-SCNC: 26 MMOL/L — SIGNIFICANT CHANGE UP (ref 22–31)
CREAT SERPL-MCNC: 1 MG/DL — SIGNIFICANT CHANGE UP (ref 0.5–1.3)
CULTURE RESULTS: SIGNIFICANT CHANGE UP
GLUCOSE SERPL-MCNC: 96 MG/DL — SIGNIFICANT CHANGE UP (ref 70–99)
HCT VFR BLD CALC: 36.2 % — SIGNIFICANT CHANGE UP (ref 34.5–45)
HGB BLD-MCNC: 11.2 G/DL — LOW (ref 11.5–15.5)
MAGNESIUM SERPL-MCNC: 2.4 MG/DL — SIGNIFICANT CHANGE UP (ref 1.6–2.6)
MCHC RBC-ENTMCNC: 26.7 PG — LOW (ref 27–34)
MCHC RBC-ENTMCNC: 30.9 GM/DL — LOW (ref 32–36)
MCV RBC AUTO: 86.2 FL — SIGNIFICANT CHANGE UP (ref 80–100)
METHOD TYPE: SIGNIFICANT CHANGE UP
NRBC # BLD: 0 /100 WBCS — SIGNIFICANT CHANGE UP (ref 0–0)
ORGANISM # SPEC MICROSCOPIC CNT: SIGNIFICANT CHANGE UP
ORGANISM # SPEC MICROSCOPIC CNT: SIGNIFICANT CHANGE UP
PLATELET # BLD AUTO: 360 K/UL — SIGNIFICANT CHANGE UP (ref 150–400)
POTASSIUM SERPL-MCNC: 3.9 MMOL/L — SIGNIFICANT CHANGE UP (ref 3.5–5.3)
POTASSIUM SERPL-SCNC: 3.9 MMOL/L — SIGNIFICANT CHANGE UP (ref 3.5–5.3)
RBC # BLD: 4.2 M/UL — SIGNIFICANT CHANGE UP (ref 3.8–5.2)
RBC # FLD: 15.3 % — HIGH (ref 10.3–14.5)
SODIUM SERPL-SCNC: 143 MMOL/L — SIGNIFICANT CHANGE UP (ref 135–145)
SPECIMEN SOURCE: SIGNIFICANT CHANGE UP
WBC # BLD: 13.71 K/UL — HIGH (ref 3.8–10.5)
WBC # FLD AUTO: 13.71 K/UL — HIGH (ref 3.8–10.5)

## 2020-01-12 RX ORDER — ACETAMINOPHEN 500 MG
1000 TABLET ORAL ONCE
Refills: 0 | Status: COMPLETED | OUTPATIENT
Start: 2020-01-12 | End: 2020-01-12

## 2020-01-12 RX ORDER — IBUPROFEN 200 MG
200 TABLET ORAL ONCE
Refills: 0 | Status: COMPLETED | OUTPATIENT
Start: 2020-01-12 | End: 2020-01-13

## 2020-01-12 RX ADMIN — CEFTRIAXONE 100 MILLIGRAM(S): 500 INJECTION, POWDER, FOR SOLUTION INTRAMUSCULAR; INTRAVENOUS at 17:11

## 2020-01-12 RX ADMIN — AZITHROMYCIN 255 MILLIGRAM(S): 500 TABLET, FILM COATED ORAL at 17:12

## 2020-01-12 RX ADMIN — ENOXAPARIN SODIUM 70 MILLIGRAM(S): 100 INJECTION SUBCUTANEOUS at 17:13

## 2020-01-12 RX ADMIN — Medication 100 MILLIGRAM(S): at 04:49

## 2020-01-12 RX ADMIN — SIMVASTATIN 40 MILLIGRAM(S): 20 TABLET, FILM COATED ORAL at 22:20

## 2020-01-12 RX ADMIN — Medication 100 MILLIGRAM(S): at 13:22

## 2020-01-12 RX ADMIN — Medication 100 MILLIGRAM(S): at 17:22

## 2020-01-12 RX ADMIN — Medication 150 MILLIGRAM(S): at 12:06

## 2020-01-12 RX ADMIN — Medication 400 MILLIGRAM(S): at 16:01

## 2020-01-12 RX ADMIN — Medication 1000 MILLIGRAM(S): at 16:30

## 2020-01-12 RX ADMIN — ENOXAPARIN SODIUM 70 MILLIGRAM(S): 100 INJECTION SUBCUTANEOUS at 05:42

## 2020-01-12 RX ADMIN — Medication 100 MILLIGRAM(S): at 22:20

## 2020-01-12 NOTE — PROGRESS NOTE ADULT - SUBJECTIVE AND OBJECTIVE BOX
Time of Visit:  Patient seen and examined.     MEDICATIONS  (STANDING):  azithromycin  IVPB 500 milliGRAM(s) IV Intermittent every 24 hours  cefTRIAXone   IVPB 1000 milliGRAM(s) IV Intermittent every 24 hours  enoxaparin Injectable 70 milliGRAM(s) SubCutaneous two times a day  influenza   Vaccine 0.5 milliLiter(s) IntraMuscular once  simvastatin 40 milliGRAM(s) Oral at bedtime  venlafaxine XR. 150 milliGRAM(s) Oral daily      MEDICATIONS  (PRN):  acetaminophen   Tablet .. 650 milliGRAM(s) Oral every 6 hours PRN Mild Pain (1 - 3)  benzonatate 100 milliGRAM(s) Oral three times a day PRN Cough  guaiFENesin   Syrup  (Sugar-Free) 100 milliGRAM(s) Oral every 6 hours PRN Cough  ibuprofen  Tablet. 200 milliGRAM(s) Oral once PRN Moderate Pain (4 - 6)  ipratropium    for Nebulization 500 MICROGram(s) Nebulizer every 6 hours PRN Shortness of Breath and/or Wheezing       Medications up to date at time of exam.      PHYSICAL EXAMINATION:  Patient has no new complaints.  GENERAL: The patient is a well-developed, well-nourished, in no apparent distress.     Vital Signs Last 24 Hrs  T(C): 37 (12 Jan 2020 15:37), Max: 37 (12 Jan 2020 05:20)  T(F): 98.6 (12 Jan 2020 15:37), Max: 98.6 (12 Jan 2020 05:20)  HR: 102 (12 Jan 2020 15:37) (93 - 108)  BP: 153/87 (12 Jan 2020 15:37) (144/72 - 154/70)  BP(mean): --  RR: 18 (12 Jan 2020 15:37) (18 - 19)  SpO2: 98% (12 Jan 2020 15:37) (97% - 100%)   (if applicable)    Chest Tube (if applicable)    HEENT: Head is normocephalic and atraumatic. Extraocular muscles are intact. Mucous membranes are moist.     NECK: Supple, no palpable adenopathy.    LUNGS: Clear to auscultation, no wheezing, rales, or rhonchi.    HEART: Regular rate and rhythm without murmur.    ABDOMEN: Soft, nontender, and nondistended.  No hepatosplenomegaly is noted.    : No painful voiding, no pelvic pain    EXTREMITIES: Without any cyanosis, clubbing, rash, lesions or edema.    NEUROLOGIC: Awake, alert, oriented, grossly intact    SKIN: Warm, dry, good turgor.      LABS:                        11.2   13.71 )-----------( 360      ( 12 Jan 2020 08:14 )             36.2     01-12    143  |  110<H>  |  30<H>  ----------------------------<  96  3.9   |  26  |  1.00    Ca    8.8      12 Jan 2020 08:14  Mg     2.4     01-12                      Procalcitonin, Serum: 3.87 ng/mL (01-10-20 @ 02:51)      MICROBIOLOGY: (if applicable)    RADIOLOGY & ADDITIONAL STUDIES:  EKG:   CXR:  ECHO:< from: Transthoracic Echocardiogram (01.11.20 @ 06:37) >    Patient name: FREDIS HAYES  YOB: 1950   Age: 69 (F)   MR#: [id number]  Study Date: 1/11/2020  Location: [address] [place], [place] [address]Sonographer: Lynne Jesus [facility]  Study quality: Fair  Referring Physician:  TRAVIS FONSECA MD  Blood Pressure: 140/55 mmHg  Height: 170 cm  Weight: 77 kg  BSA: 1.9 m2  ------------------------------------------------------------------------    PROCEDURE: Transthoracic echocardiogram with 2-D, M-Mode  and complete spectral and color flow Doppler.  INDICATION: Abnormal electrocardiogram [ECG] [EKG] (R94.31)  HISTORY:  ------------------------------------------------------------------------  DIMENSIONS:  Dimensions:     Normal Values:  LA:     3.6 cm    2.0 - 4.0 cm  Ao:     3.2 cm    2.0 - 3.8 cm  SEPTUM: 1.2 cm   0.6 - 1.2 cm  PWT:    1.1 cm    0.6 - 1.1 cm  LVIDd:  4.1 cm    3.0 - 5.6 cm  LVIDs:  2.8 cm    1.8 - 4.0 cm      Derived Variables:  LVMI: 85 g/m2  RWT: 0.53  Ejection Fraction Visual Estimate: >55 %    ------------------------------------------------------------------------  OBSERVATIONS:  Mitral Valve: Normal mitral valve. Trace mitral  regurgitation.  Aortic Root: Normal aortic root.  Aortic Valve: Normal trileaflet aortic valve.  Left Atrium: Normal left atrium.  LA volume index = 22  cc/m2.  Left Ventricle: Normal left ventricular systolic function.  Increased relative wall thickness with normal left  ventricular (LV) mass index, consistent with concentric LV  remodeling.  Right Heart: Normal right atrium. Normal right ventricular  size and function. There is mild tricuspid regurgitation.  There is trace pulmonic regurgitation.  Pericardium/PleuraNormal pericardium with no pericardial  effusion.  Hemodynamic: RA Pressure is 10 mm Hg. RV systolic pressure  is 29 mm Hg.  ------------------------------------------------------------------------  CONCLUSIONS:  1. Trace mitral regurgitation.  2. Increased relative wall thickness with normal left  ventricular (LV) mass index, consistent with concentric LV  remodeling.  3. Normal left ventricular systolic function.  4. Normal right ventricular size and function.    ------------------------------------------------------------------------  Confirmed on  1/12/2020 - 16:21:15 by Brandon Delarosa MD  ------------------------------------------------------------------------    < end of copied text >    b/L venous doppler: < from: US Duplex Venous Lower Ext Complete, Bilateral (01.09.20 @ 19:52) >    EXAM:  US DPLX LWR EXT VEINS COMPL BI                            PROCEDURE DATE:  01/09/2020          INTERPRETATION:  CLINICAL INFORMATION: Possible pulmonary embolism, shortness of breath    COMPARISON: None available.    TECHNIQUE: Duplex sonography of the BILATERAL LOWER extremity veins with color and spectral Doppler, with and without compression.      FINDINGS:    There is normal compressibility of the bilateral common femoral, femoral and popliteal veins.     Doppler examination shows normal spontaneous and phasic flow.    No calf vein thrombosis is detected.    IMPRESSION:     No evidence of deep venous thrombosis in either lower extremity.                  TAYLOR DEE M.D.,ATTENDING RADIOLOGIST  This document has been electronically signed. Jan 9 2020  8:00PM                < end of copied text >    IMPRESSION: 69y Female PAST MEDICAL & SURGICAL HISTORY:  Latent tuberculosis  Hyperlipidemia  HTN (hypertension)   p/w           Impression; This is a  69 yr old  woman presented with cough with whitish sputum since 1 1/2 week which is getting worse. She had some blood on her sputum today.  She has been having low grade fever since same time period along with chills,  generalized weakness, decreased appetite, headache, back pain. Today when she went to [facility],  she felt like almost passing out and looked pale and clammy as per . Episode of SOB on exertion , Cough, Low grade Fever due to CXR showing Right lung Pneumonia and CT Chest with chronic Right Middle Lobe Pulmonary Embolus.    Echo is normal , no pul htn. If pat has chronic PE , there should be pul hypertension.. will need V/Q scan to confirm PE.  Maria Elena is going to live in [place] for 6 months and asked for all  work up to be done  . PAt is afebrile and WBC is trending down. Cultures neg    Suggestion;    -V/Q scan  -continue anticoagulation until v/q result   -continue antibx  -duonebs q6h  -PT  -Renal CT to evaluate abnormality    i spokw with dr tapia .. agreed with plan  and i spoke  with pat and spouse at bedside today >37 min

## 2020-01-12 NOTE — PROGRESS NOTE ADULT - ASSESSMENT
_________________________________________________________________________________________  ========>>  M E D I C A L   A T T E N D I N G    F O L L O W  U P  N O T E  <<=========  -----------------------------------------------------------------------------------------------------    - Patient seen and examined by me earlier today.  (covering today)   - In summary,  FREDIS HAYES is a 69y year old woman who originally presented with cough / PNA  - Patient today overall doing ok, comfortable, eating OK.     ==================>> REVIEW OF SYSTEM <<=================    GEN: no fever, no chills, no pain  RESP: + SOB / LEGGETT , ++ cough, + white and green sputum, less bloody   CVS: no chest pain, no palpitations, no edema  GI: no abdominal pain, no nausea, no constipation, no diarrhea  : no dysuria, no frequency, no hematuria  Neuro: no headache, no dizziness  Derm : no itching, no rash    ==================>> PHYSICAL EXAM <<=================    GEN: A&O X 3 , NAD , comfortable  HEENT: NCAT, PERRL, MMM, hearing intact  Neck: supple , no JVD  CVS: S1S2 , regular , No M/R/G appreciated  PULM: scattered bilateral rhonchi   ABD.: soft. non tender, non distended,  bowel sounds present  Extrem: intact pulses , no edema   PSYCH : normal mood,  not anxious      ==================>> MEDICATIONS <<====================    MEDICATIONS  (STANDING):  azithromycin  IVPB 500 milliGRAM(s) IV Intermittent every 24 hours  cefTRIAXone   IVPB 1000 milliGRAM(s) IV Intermittent every 24 hours  enoxaparin Injectable 70 milliGRAM(s) SubCutaneous two times a day  influenza   Vaccine 0.5 milliLiter(s) IntraMuscular once  simvastatin 40 milliGRAM(s) Oral at bedtime  venlafaxine XR. 150 milliGRAM(s) Oral daily    MEDICATIONS  (PRN):  acetaminophen   Tablet .. 650 milliGRAM(s) Oral every 6 hours PRN Mild Pain (1 - 3)  guaiFENesin   Syrup  (Sugar-Free) 100 milliGRAM(s) Oral every 6 hours PRN Cough  ipratropium    for Nebulization 500 MICROGram(s) Nebulizer every 6 hours PRN Shortness of Breath and/or Wheezing      ==================>> VITAL SIGNS <<==================    T(C): 37 (01-12-20 @ 05:20), Max: 37 (01-12-20 @ 05:20)  HR: 96 (01-12-20 @ 05:20) (93 - 96)  BP: 144/72 (01-12-20 @ 05:20) (144/72 - 148/79)  RR: 18 (01-12-20 @ 05:20) (18 - 19)  SpO2: 97% (01-12-20 @ 05:20) (97% - 98%)      ==================>> LAB AND IMAGING <<==================                        11.2   13.71 )-----------( 360      ( 12 Jan 2020 08:14 )             36.2        WBC count:   13.71 <<== ,  16.76 <<== ,  26.16 <<== ,  20.46 <<== ,  25.84 <<==     143  |  110<H>  |  30<H>  ----------------------------<  96  3.9   |  26  |  1.00    Ca    8.8      12 Jan 2020 08:14  Mg     2.4     01-12      TSH:      1.21   (01-10-20)       ,     1.55   (01-09-20)           Lipid profile:  (01-10-20)     Total: 120     LDL  : 41     HDL  :61     TG   :91     HgA1C: 5.8  (01-10-20)        , 5.6  (01-10-20)            < from: CT Angio Chest w/ IV Cont (01.09.20 @ 15:43) >  IMPRESSION:     1. 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  2. Otherwise, no evidence of acute pulmonary emboli.  3. Right lower lobe pneumonia.  4. Partially imaged right upper pole renal mass or prominent congenital lobulation. Recommend renal ultrasound for further assessment.  < end of copied text >      ECHO pending   ___________________________________________________________________________________  ===============>>  A S S E S S M E N T   A N D   P L A N <<===============  ------------------------------------------------------------------------------------------    70 y/o F with PMH of HTN, HLD, Latent TB PSH of rt. knee surgery who presented with cough, runny nose, weakness and fever    rt lower lobe pneumonia  and gram neg uti       on rocephine and zithro        doing better      nebs as above     pulm following    supportive care    pulm embolism      on lovenox       pulm f/u and further workup      hypercoag workup ?  echo pending    HTN / HLD    Continue Current medications and monitor.     -GI/DVT Prophylaxis.    --------------------------------------------  Case discussed with pt, family at bedside, Pulm   Education given on findings and plan of care  ___________________________  H. BENJAMIN Forde.  (covering today)   Pager: [phone] _________________________________________________________________________________________  ========>>  M E D I C A L   A T T E N D I N G    F O L L O W  U P  N O T E  <<=========  -----------------------------------------------------------------------------------------------------    - Patient seen and examined by me earlier today.  (covering today)   - In summary,  FREDIS HAYES is a 69y year old woman who originally presented with cough / PNA  - Patient today overall doing ok, comfortable, eating OK.     ==================>> REVIEW OF SYSTEM <<=================    GEN: no fever, no chills, no pain  RESP: + SOB / LEGGETT , ++ cough, + white and green sputum, less bloody   CVS: no chest pain, no palpitations, no edema  GI: no abdominal pain, no nausea, no constipation, no diarrhea  : no dysuria, no frequency, no hematuria  Neuro: no headache, no dizziness  Derm : no itching, no rash    ==================>> PHYSICAL EXAM <<=================    GEN: A&O X 3 , NAD , comfortable  HEENT: NCAT, PERRL, MMM, hearing intact  Neck: supple , no JVD  CVS: S1S2 , regular , No M/R/G appreciated  PULM: scattered bilateral rhonchi   ABD.: soft. non tender, non distended,  bowel sounds present  Extrem: intact pulses , no edema   PSYCH : normal mood,  not anxious      ==================>> MEDICATIONS <<====================    MEDICATIONS  (STANDING):  azithromycin  IVPB 500 milliGRAM(s) IV Intermittent every 24 hours  cefTRIAXone   IVPB 1000 milliGRAM(s) IV Intermittent every 24 hours  enoxaparin Injectable 70 milliGRAM(s) SubCutaneous two times a day  influenza   Vaccine 0.5 milliLiter(s) IntraMuscular once  simvastatin 40 milliGRAM(s) Oral at bedtime  venlafaxine XR. 150 milliGRAM(s) Oral daily    MEDICATIONS  (PRN):  acetaminophen   Tablet .. 650 milliGRAM(s) Oral every 6 hours PRN Mild Pain (1 - 3)  guaiFENesin   Syrup  (Sugar-Free) 100 milliGRAM(s) Oral every 6 hours PRN Cough  ipratropium    for Nebulization 500 MICROGram(s) Nebulizer every 6 hours PRN Shortness of Breath and/or Wheezing      ==================>> VITAL SIGNS <<==================    T(C): 37 (01-12-20 @ 05:20), Max: 37 (01-12-20 @ 05:20)  HR: 96 (01-12-20 @ 05:20) (93 - 96)  BP: 144/72 (01-12-20 @ 05:20) (144/72 - 148/79)  RR: 18 (01-12-20 @ 05:20) (18 - 19)  SpO2: 97% (01-12-20 @ 05:20) (97% - 98%)      ==================>> LAB AND IMAGING <<==================                        11.2   13.71 )-----------( 360      ( 12 Jan 2020 08:14 )             36.2        WBC count:   13.71 <<== ,  16.76 <<== ,  26.16 <<== ,  20.46 <<== ,  25.84 <<==     143  |  110<H>  |  30<H>  ----------------------------<  96  3.9   |  26  |  1.00    Ca    8.8      12 Jan 2020 08:14  Mg     2.4     01-12      TSH:      1.21   (01-10-20)       ,     1.55   (01-09-20)           Lipid profile:  (01-10-20)     Total: 120     LDL  : 41     HDL  :61     TG   :91     HgA1C: 5.8  (01-10-20)        , 5.6  (01-10-20)            < from: CT Angio Chest w/ IV Cont (01.09.20 @ 15:43) >  IMPRESSION:     1. 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  2. Otherwise, no evidence of acute pulmonary emboli.  3. Right lower lobe pneumonia.  4. Partially imaged right upper pole renal mass or prominent congenital lobulation. Recommend renal ultrasound for further assessment.  < end of copied text >      ECHO pending   ___________________________________________________________________________________  ===============>>  A S S E S S M E N T   A N D   P L A N <<===============  ------------------------------------------------------------------------------------------    70 y/o F with PMH of HTN, HLD, Latent TB PSH of rt. knee surgery who presented with cough, runny nose, weakness and fever    rt lower lobe pneumonia  and gram neg uti       on rocephine and zithro        doing better      nebs as above     pulm following    supportive care    pulm embolism      on lovenox       pulm f/u and further workup      hypercoag workup ?        pt with renal mass on CTA           will need dedicated CT +/- contrast and  evaluation once a bit improved ( pt is aware)   echo pending    HTN / HLD    Continue Current medications and monitor.     -GI/DVT Prophylaxis.    --------------------------------------------  Case discussed with pt, family at bedside, Pulm   Education given on findings and plan of care  ___________________________  H. BENJAMIN Forde.  (covering today)   Pager: [phone]

## 2020-01-13 DIAGNOSIS — N39.0 URINARY TRACT INFECTION, SITE NOT SPECIFIED: ICD-10-CM

## 2020-01-13 DIAGNOSIS — Z02.9 ENCOUNTER FOR ADMINISTRATIVE EXAMINATIONS, UNSPECIFIED: ICD-10-CM

## 2020-01-13 LAB
ANION GAP SERPL CALC-SCNC: 8 MMOL/L — SIGNIFICANT CHANGE UP (ref 5–17)
BUN SERPL-MCNC: 17 MG/DL — SIGNIFICANT CHANGE UP (ref 7–18)
CALCIUM SERPL-MCNC: 8.6 MG/DL — SIGNIFICANT CHANGE UP (ref 8.4–10.5)
CHLORIDE SERPL-SCNC: 109 MMOL/L — HIGH (ref 96–108)
CO2 SERPL-SCNC: 22 MMOL/L — SIGNIFICANT CHANGE UP (ref 22–31)
CREAT SERPL-MCNC: 0.76 MG/DL — SIGNIFICANT CHANGE UP (ref 0.5–1.3)
GLUCOSE SERPL-MCNC: 97 MG/DL — SIGNIFICANT CHANGE UP (ref 70–99)
HCT VFR BLD CALC: 37.1 % — SIGNIFICANT CHANGE UP (ref 34.5–45)
HGB BLD-MCNC: 11.4 G/DL — LOW (ref 11.5–15.5)
MAGNESIUM SERPL-MCNC: 1.9 MG/DL — SIGNIFICANT CHANGE UP (ref 1.6–2.6)
MCHC RBC-ENTMCNC: 26.6 PG — LOW (ref 27–34)
MCHC RBC-ENTMCNC: 30.7 GM/DL — LOW (ref 32–36)
MCV RBC AUTO: 86.5 FL — SIGNIFICANT CHANGE UP (ref 80–100)
NRBC # BLD: 0 /100 WBCS — SIGNIFICANT CHANGE UP (ref 0–0)
PLATELET # BLD AUTO: 354 K/UL — SIGNIFICANT CHANGE UP (ref 150–400)
POTASSIUM SERPL-MCNC: 3.6 MMOL/L — SIGNIFICANT CHANGE UP (ref 3.5–5.3)
POTASSIUM SERPL-SCNC: 3.6 MMOL/L — SIGNIFICANT CHANGE UP (ref 3.5–5.3)
RBC # BLD: 4.29 M/UL — SIGNIFICANT CHANGE UP (ref 3.8–5.2)
RBC # FLD: 15.5 % — HIGH (ref 10.3–14.5)
SODIUM SERPL-SCNC: 139 MMOL/L — SIGNIFICANT CHANGE UP (ref 135–145)
WBC # BLD: 10.57 K/UL — HIGH (ref 3.8–10.5)
WBC # FLD AUTO: 10.57 K/UL — HIGH (ref 3.8–10.5)

## 2020-01-13 PROCEDURE — 71045 X-RAY EXAM CHEST 1 VIEW: CPT | Mod: 26

## 2020-01-13 RX ORDER — SODIUM CHLORIDE 9 MG/ML
1000 INJECTION INTRAMUSCULAR; INTRAVENOUS; SUBCUTANEOUS
Refills: 0 | Status: DISCONTINUED | OUTPATIENT
Start: 2020-01-13 | End: 2020-01-16

## 2020-01-13 RX ORDER — ACETAMINOPHEN 500 MG
1000 TABLET ORAL ONCE
Refills: 0 | Status: COMPLETED | OUTPATIENT
Start: 2020-01-13 | End: 2020-01-13

## 2020-01-13 RX ORDER — IPRATROPIUM/ALBUTEROL SULFATE 18-103MCG
3 AEROSOL WITH ADAPTER (GRAM) INHALATION EVERY 6 HOURS
Refills: 0 | Status: COMPLETED | OUTPATIENT
Start: 2020-01-13 | End: 2020-01-16

## 2020-01-13 RX ORDER — METOPROLOL TARTRATE 50 MG
50 TABLET ORAL DAILY
Refills: 0 | Status: DISCONTINUED | OUTPATIENT
Start: 2020-01-13 | End: 2020-01-16

## 2020-01-13 RX ORDER — AMLODIPINE BESYLATE 2.5 MG/1
2.5 TABLET ORAL DAILY
Refills: 0 | Status: DISCONTINUED | OUTPATIENT
Start: 2020-01-13 | End: 2020-01-14

## 2020-01-13 RX ORDER — KETOROLAC TROMETHAMINE 30 MG/ML
30 SYRINGE (ML) INJECTION EVERY 8 HOURS
Refills: 0 | Status: DISCONTINUED | OUTPATIENT
Start: 2020-01-13 | End: 2020-01-16

## 2020-01-13 RX ADMIN — ENOXAPARIN SODIUM 70 MILLIGRAM(S): 100 INJECTION SUBCUTANEOUS at 08:18

## 2020-01-13 RX ADMIN — SODIUM CHLORIDE 80 MILLILITER(S): 9 INJECTION INTRAMUSCULAR; INTRAVENOUS; SUBCUTANEOUS at 18:08

## 2020-01-13 RX ADMIN — Medication 400 MILLIGRAM(S): at 14:24

## 2020-01-13 RX ADMIN — Medication 650 MILLIGRAM(S): at 02:45

## 2020-01-13 RX ADMIN — Medication 400 MILLIGRAM(S): at 10:46

## 2020-01-13 RX ADMIN — ENOXAPARIN SODIUM 70 MILLIGRAM(S): 100 INJECTION SUBCUTANEOUS at 18:09

## 2020-01-13 RX ADMIN — AMLODIPINE BESYLATE 2.5 MILLIGRAM(S): 2.5 TABLET ORAL at 22:35

## 2020-01-13 RX ADMIN — Medication 30 MILLIGRAM(S): at 18:14

## 2020-01-13 RX ADMIN — Medication 3 MILLILITER(S): at 15:13

## 2020-01-13 RX ADMIN — Medication 650 MILLIGRAM(S): at 03:46

## 2020-01-13 RX ADMIN — Medication 100 MILLIGRAM(S): at 08:09

## 2020-01-13 RX ADMIN — Medication 150 MILLIGRAM(S): at 12:39

## 2020-01-13 RX ADMIN — Medication 30 MILLIGRAM(S): at 19:55

## 2020-01-13 RX ADMIN — Medication 50 MILLIGRAM(S): at 09:33

## 2020-01-13 RX ADMIN — AZITHROMYCIN 255 MILLIGRAM(S): 500 TABLET, FILM COATED ORAL at 18:09

## 2020-01-13 RX ADMIN — Medication 1000 MILLIGRAM(S): at 10:25

## 2020-01-13 RX ADMIN — Medication 3 MILLILITER(S): at 22:16

## 2020-01-13 RX ADMIN — Medication 200 MILLIGRAM(S): at 08:06

## 2020-01-13 RX ADMIN — Medication 200 MILLIGRAM(S): at 09:34

## 2020-01-13 RX ADMIN — Medication 1000 MILLIGRAM(S): at 14:43

## 2020-01-13 RX ADMIN — Medication 100 MILLIGRAM(S): at 22:39

## 2020-01-13 RX ADMIN — Medication 650 MILLIGRAM(S): at 23:30

## 2020-01-13 RX ADMIN — CEFTRIAXONE 100 MILLIGRAM(S): 500 INJECTION, POWDER, FOR SOLUTION INTRAMUSCULAR; INTRAVENOUS at 18:09

## 2020-01-13 RX ADMIN — SIMVASTATIN 40 MILLIGRAM(S): 20 TABLET, FILM COATED ORAL at 22:35

## 2020-01-13 RX ADMIN — Medication 650 MILLIGRAM(S): at 22:36

## 2020-01-13 NOTE — PROGRESS NOTE ADULT - PROBLEM SELECTOR PLAN 1
c/w ceftriaxone and zithromax IV ABT D 5  afebrile, WBC trending down  c/w supplemental O2 for SOB  c/w amadob   Dr. Lindsey is following

## 2020-01-13 NOTE — PHARMACOTHERAPY INTERVENTION NOTE - COMMENTS
Patient was prescribed Ipratropium for shortness of breath.  Recommended to add Albuterol.
Recommended to change IV Azithromycin (day#4) to PO.  WBC are coming down, and pt takes other oral meds.

## 2020-01-13 NOTE — PROGRESS NOTE ADULT - ASSESSMENT
1.	Pneumonia(CAP)  2.	?UTI - patient has no symptoms   3.	Leukocytosis - resolved  ·	cont Rocephin 1 gm iv q24hrs  D5  ·	cont Azithromycin 500mgs iv q24hrs, dc this after today dose  ·	going for CT A/PO today

## 2020-01-13 NOTE — PROGRESS NOTE ADULT - SUBJECTIVE AND OBJECTIVE BOX
69y Female is under our care for pneumonia (CAP), +UC though asxs and leukocytosis. At present, patient is feeling better, but still c/o cough with now clearish sputum and right upper back pain. Awaiting to go for CT A/P today to evaluate for renal mass. No fevers and wbc count has normalized. +UC for >100k e. coli however patient has been asymptomatic.     REVIEW OF SYSTEMS:  [  ] Not able to illicit  General: no fevers no malaise  Chest: +productive cough no sob  GI: no nvd  : no urinary sxs   Skin: no rashes  Musculoskeletal: no trauma +back pain  Neuro: no ha's no dizziness     MEDS:  azithromycin  IVPB 500 milliGRAM(s) IV Intermittent every 24 hours  cefTRIAXone   IVPB 1000 milliGRAM(s) IV Intermittent every 24 hours    ALLERGIES: Allergies    sulfa drugs (Rash)  Xylocaine 10% Oral (Short breath)    Intolerances      VITALS:  Vital Signs Last 24 Hrs  T(C): 36.6 (13 Jan 2020 14:15), Max: 37 (13 Jan 2020 05:09)  T(F): 97.8 (13 Jan 2020 14:15), Max: 98.6 (13 Jan 2020 05:09)  HR: 104 (13 Jan 2020 15:49) (93 - 104)  BP: 167/73 (13 Jan 2020 14:15) (149/70 - 167/73)  BP(mean): --  RR: 18 (13 Jan 2020 14:15) (17 - 18)  SpO2: 95% (13 Jan 2020 15:49) (95% - 100%)      PHYSICAL EXAM:  HEENT: +NC  Neck: supple no LN's   Respiratory: lungs sound clear, but distant no rales  Cardiovascular: S1 S2 reg no murmurs  Gastrointestinal: +BS with soft, large abdominal pannus; nontender  Extremities: trace pitting edema of ankles  Skin: no rashes  Ortho: soreness over right sided thoracic region   Neuro: AAO x 3      LABS/DIAGNOSTIC TESTS:                        11.4   10.57 )-----------( 354      ( 13 Jan 2020 08:41 )             37.1     WBC Count: 10.57 K/uL (01-13 @ 08:41)  WBC Count: 13.71 K/uL (01-12 @ 08:14)  WBC Count: 16.76 K/uL (01-11 @ 08:32)  WBC Count: 26.16 K/uL (01-10 @ 08:16)  WBC Count: 20.46 K/uL (01-09 @ 20:59)    01-13    139  |  109<H>  |  17  ----------------------------<  97  3.6   |  22  |  0.76    Ca    8.6      13 Jan 2020 08:41  Mg     1.9     01-13        CULTURES:   .Blood  01-10 @ 14:37   No growth to date.  --  --      .Urine  01-10 @ 00:35   >100,000 CFU/ml Escherichia coli  --  Escherichia coli        RADIOLOGY:  no new studies

## 2020-01-13 NOTE — PROGRESS NOTE ADULT - SUBJECTIVE AND OBJECTIVE BOX
NP Note discussed with  Primary Attending    Patient is a 69y old  Female who presents with a chief complaint of Cough, fever, (12 Jan 2020 17:31)    HPI: 69 year old Woman with hx of HTN, HLD, Latent TB PSH of rt. knee surgery who presented on 1/9/2020 with c/o 10 day hx of cough, runny nose, weakness, chills, fever with blood tinged sputum same day of ED arrival. Found to have RLL PNA; pt admitted for CAP and CT angio with possible chronic PE.  Seen by pulmonologist Dr. Lindsey, will need V/Q scan to confirm PE.  Patient is going to live in [place] for 6 months and asked for all  work up to be done. Afebrile, WBC trending down well. Blood culture is negative. Still have SOB at times, continue duoneb nebulizer, Rt flank pain controlled well on IV tylenol per pt. Spoke with nuclear medicine MD Dr. joe, get CXR portable prior to VQ scan today.                             INTERVAL HPI/OVERNIGHT EVENTS: no new complaints    MEDICATIONS  (STANDING):  azithromycin  IVPB 500 milliGRAM(s) IV Intermittent every 24 hours  cefTRIAXone   IVPB 1000 milliGRAM(s) IV Intermittent every 24 hours  enoxaparin Injectable 70 milliGRAM(s) SubCutaneous two times a day  influenza   Vaccine 0.5 milliLiter(s) IntraMuscular once  metoprolol succinate ER 50 milliGRAM(s) Oral daily  simvastatin 40 milliGRAM(s) Oral at bedtime  venlafaxine XR. 150 milliGRAM(s) Oral daily    MEDICATIONS  (PRN):  acetaminophen   Tablet .. 650 milliGRAM(s) Oral every 6 hours PRN Mild Pain (1 - 3)  benzonatate 100 milliGRAM(s) Oral three times a day PRN Cough  guaiFENesin   Syrup  (Sugar-Free) 100 milliGRAM(s) Oral every 6 hours PRN Cough  ipratropium    for Nebulization 500 MICROGram(s) Nebulizer every 6 hours PRN Shortness of Breath and/or Wheezing      __________________________________________________  REVIEW OF SYSTEMS:    CONSTITUTIONAL: No fever,   EYES: no acute visual disturbances  NECK: No pain or stiffness  RESPIRATORY: No cough; shortness of breath  CARDIOVASCULAR: No chest pain, no palpitations  GASTROINTESTINAL: No pain. No nausea or vomiting; No diarrhea   NEUROLOGICAL: No headache or numbness, no tremors  MUSCULOSKELETAL: No joint pain, no muscle pain  GENITOURINARY: no dysuria, no frequency, no hesitancy  PSYCHIATRY: no depression , no anxiety  ALL OTHER  ROS negative        Vital Signs Last 24 Hrs  T(C): 37 (13 Jan 2020 05:09), Max: 37 (12 Jan 2020 15:37)  T(F): 98.6 (13 Jan 2020 05:09), Max: 98.6 (12 Jan 2020 15:37)  HR: 102 (13 Jan 2020 05:09) (102 - 108)  BP: 157/84 (13 Jan 2020 05:09) (149/70 - 157/84)  BP(mean): --  RR: 18 (13 Jan 2020 05:09) (17 - 18)  SpO2: 100% (13 Jan 2020 05:09) (98% - 100%)    ________________________________________________  PHYSICAL EXAM:  GENERAL: NAD  HEENT: Normocephalic;  conjunctivae and sclerae clear; moist mucous membranes;   NECK : supple  CHEST/LUNG: Clear to auscultation bilaterally with good air entry, SOB, (+) supplemental O2   HEART: S1 S2  regular; no murmurs, gallops or rubs  ABDOMEN: Soft, Nontender, Nondistended; Bowel sounds present  EXTREMITIES: no cyanosis; no edema; no calf tenderness  SKIN: warm and dry; no rash  NERVOUS SYSTEM:  Awake and alert; Oriented  to place, person and time ; no new deficits    _________________________________________________  LABS:                        11.4   10.57 )-----------( 354      ( 13 Jan 2020 08:41 )             37.1     01-13    139  |  109<H>  |  17  ----------------------------<  97  3.6   |  22  |  0.76    Ca    8.6      13 Jan 2020 08:41  Mg     1.9     01-13          CAPILLARY BLOOD GLUCOSE            RADIOLOGY & ADDITIONAL TESTS:  EXAM:  CT ANGIO CHEST (W)AW IC                            PROCEDURE DATE:  01/09/2020          INTERPRETATION:  CLINICAL INFORMATION: Cough and chest pain    COMPARISON: None.    PROCEDURE:   CT Angiography of the Chest.  60 ml of Omnipaque 350 was injected intravenously. 40 ml were discarded.  Sagittal and coronal reformats were performed as well as 3D (MIP) reconstructions.      FINDINGS:    LUNGS AND AIRWAYS: Patent central airways.  Right lower lobe consolidation. Dependent and basilar atelectasis. Nodular density along the horizontal fissure, possibly lymph node.    PLEURA: Small right pleural effusion.    MEDIASTINUM AND CHAMP: Prominent mediastinal and right hilar lymph nodes.    VESSELS: Atherosclerotic changes. No thoracic aortic aneurysm or dissection. Linear filling defect within the middle lobe branch of the right pulmonary artery, possibly chronic embolus or web. Otherwise, no acute appearing pulmonary arterial filling defects.    HEART: Heart size is normal. No pericardial effusion.    CHEST WALL AND LOWER NECK: Within normal limits.    VISUALIZED UPPER ABDOMEN: Partially imaged right upper pole renal mass measuring 3.7 cm or partially imaged prominent congenital lobulation.    BONES: Degenerative changes.    IMPRESSION:     1. 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  2. Otherwise, no evidence of acute pulmonary emboli.  3. Right lower lobe pneumonia.  4. Partially imaged right upper pole renal mass or prominent congenital lobulation. Recommend renal ultrasound for further assessment.          IVONNE HALE M.D., ATTENDING RADIOLOGIST  This document has been electronically signed. Jan 9 2020  3:56PM            EXAM:  US KIDNEY(S)                            PROCEDURE DATE:  01/09/2020          INTERPRETATION:  CLINICAL INFORMATION: Possible kidney mass, possible pyelonephritis    COMPARISON: CT angiogram of the chest of earlier in the day.    TECHNIQUE: Sonography of the kidneys and bladder. Study is limited by large patient body habitus    FINDINGS:    Right kidney:  9.3 cm. No renal mass, hydronephrosis or calculi.    Left kidney:  9.9 cm. No renal mass, hydronephrosis or calculi.    Urinary bladder: Within normal limits.    IMPRESSION:     Limited by large body habitus. No gross abnormality seen. However cannot exclude right renal mass based on this imaging and correlation with CT or MR imaging of the abdomen is recommended            TAYLOR DEE M.D.,ATTENDING RADIOLOGIST  This document has been electronically signed. Jan 9 2020  7:57PM              EXAM:  US DPLX LWR EXT VEINS COMPL BI                            PROCEDURE DATE:  01/09/2020          INTERPRETATION:  CLINICAL INFORMATION: Possible pulmonary embolism, shortness of breath    COMPARISON: None available.    TECHNIQUE: Duplex sonography of the BILATERAL LOWER extremity veins with color and spectral Doppler, with and without compression.      FINDINGS:    There is normal compressibility of the bilateral common femoral, femoral and popliteal veins.     Doppler examination shows normal spontaneous and phasic flow.    No calf vein thrombosis is detected.    IMPRESSION:     No evidence of deep venous thrombosis in either lower extremity.          TAYLOR DEE M.D.,ATTENDING RADIOLOGIST  This document has been electronically signed. Jan 9 2020  8:00PM         EXAM:  XR CHEST PA LAT 2V                            PROCEDURE DATE:  01/09/2020          INTERPRETATION:  Frontal and lateral chest on January 9, 2020 3:41 PM. Patient has cough and weakness.    Heart is normal for projection.    There is a right base infiltrate medially that is better seen on CAT scan of the same day.    IMPRESSION: Right base infiltrate.                DAO ISRAEL M.D., ATTENDING RADIOLOGIST  This document has been electronically signed. Jan 9 2020  3:58PM                 Imaging Personally Reviewed:  YES    Consultant(s) Notes Reviewed:   YES    Care Discussed with Consultants : pulRONI hines     Plan of care was discussed with patient and /or primary care giver; all questions and concerns were addressed and care was aligned with patient's wishes.

## 2020-01-13 NOTE — PROGRESS NOTE ADULT - ASSESSMENT
seen and examined vsstable afebrile physical unchaged  no cp or sob or palp. on chair comfortable not in any distress on 2 lnc  still ahs cough. non productive  labs noted  wbc nml now   a/p as per pulm  for vq scan PE on lovenox sq  cont iv antibx  cxr pending

## 2020-01-13 NOTE — CONSULT NOTE ADULT - ASSESSMENT
69 y.o. F with incidental R renal mass vs congenital lobuation    -Recommend Renal US to evaluate CT findings  -If US findings positive for mass, recommend further work up with CT abd/pelvis with and without contrast  -Cont present care per primary team 69 y.o. F with incidental R renal mass vs congenital lobulation    -Recommend Renal US to evaluate CT findings  -If US findings positive for mass, recommend further work up with CT abd/pelvis with and without contrast  -Cont present care per primary team

## 2020-01-13 NOTE — PROGRESS NOTE ADULT - PROBLEM SELECTOR PLAN 3
see CTA as above  waiting for VQ scan to confirm PE   c/w lovenox 70mg BID until VQ scan result is available

## 2020-01-13 NOTE — PROGRESS NOTE ADULT - SUBJECTIVE AND OBJECTIVE BOX
HPI:  Pt is a 70 y/o F with PMH of HTN, HLD, Latent TB PSH of rt. knee surgery who presented with cough, runny nose, weakness and fever since 1 and half week.  According to  the pt, she started developing cough with whitish sputum since 1 1/2 week which is getting worse. She had some blood on her sputum today.  She has been having low grade fever since same time period along with chills,  generalized weakness, decreased appetite, headache, back pain. Today when she went to [facility],  she felt like almost passing out and looked pale and clammy as per .  She also complained of burning sensation while passing urine  and occasional urinary incontinence. (09 Jan 2020 21:54)      Patient is a 69y old  Female who presents with a chief complaint of Cough, fever, (13 Jan 2020 13:10)      INTERVAL HPI/OVERNIGHT EVENTS:  T(C): 37 (01-13-20 @ 05:09), Max: 37 (01-12-20 @ 15:37)  HR: 102 (01-13-20 @ 05:09) (102 - 108)  BP: 157/84 (01-13-20 @ 05:09) (149/70 - 157/84)  RR: 18 (01-13-20 @ 05:09) (17 - 18)  SpO2: 100% (01-13-20 @ 05:09) (98% - 100%)  Wt(kg): --  I&O's Summary      REVIEW OF SYSTEMS: denies fever, chills, SOB, palpitations, chest pain, abdominal pain, nausea, vomitting, diarrhea, constipation, dizziness    MEDICATIONS  (STANDING):  albuterol/ipratropium for Nebulization. 3 milliLiter(s) Nebulizer every 6 hours  amLODIPine   Tablet 2.5 milliGRAM(s) Oral daily  azithromycin  IVPB 500 milliGRAM(s) IV Intermittent every 24 hours  cefTRIAXone   IVPB 1000 milliGRAM(s) IV Intermittent every 24 hours  enoxaparin Injectable 70 milliGRAM(s) SubCutaneous two times a day  influenza   Vaccine 0.5 milliLiter(s) IntraMuscular once  metoprolol succinate ER 50 milliGRAM(s) Oral daily  simvastatin 40 milliGRAM(s) Oral at bedtime  venlafaxine XR. 150 milliGRAM(s) Oral daily    MEDICATIONS  (PRN):  acetaminophen   Tablet .. 650 milliGRAM(s) Oral every 6 hours PRN Mild Pain (1 - 3)  benzonatate 100 milliGRAM(s) Oral three times a day PRN Cough  guaiFENesin   Syrup  (Sugar-Free) 100 milliGRAM(s) Oral every 6 hours PRN Cough      PHYSICAL EXAM:  GENERAL: NAD, well-groomed, well-developed  HEAD:  Atraumatic, Normocephalic  EYES: EOMI, PERRLA, conjunctiva and sclera clear  ENMT: No tonsillar erythema, exudates, or enlargement; Moist mucous membranes, Good dentition, No lesions  NECK: Supple, No JVD, Normal thyroid  NERVOUS SYSTEM:  Alert & Oriented X3, Good concentration; Motor Strength 5/5 B/L upper and lower extremities; DTRs 2+ intact and symmetric  CHEST/LUNG: Clear to percussion bilaterally; No rales, rhonchi, wheezing, or rubs  HEART: Regular rate and rhythm; No murmurs, rubs, or gallops  ABDOMEN: Soft, Nontender, Nondistended; Bowel sounds present  EXTREMITIES:  2+ Peripheral Pulses, No clubbing, cyanosis, or edema  LYMPH: No lymphadenopathy noted  SKIN: No rashes or lesions  LABS:                        11.4   10.57 )-----------( 354      ( 13 Jan 2020 08:41 )             37.1     01-13    139  |  109<H>  |  17  ----------------------------<  97  3.6   |  22  |  0.76    Ca    8.6      13 Jan 2020 08:41  Mg     1.9     01-13          CAPILLARY BLOOD GLUCOSE

## 2020-01-13 NOTE — CONSULT NOTE ADULT - SUBJECTIVE AND OBJECTIVE BOX
Patient is a 69y old  Female who presents with a chief complaint of Cough, fever, (13 Jan 2020 10:49)      HPI  Called see and eval 69y.o. Female w/PMH as below for incidental R renal mass. Pt presented to [facility] ER c/o right back pain associated with cough and fever. Pt states that pain is exacerbated by deep inspiration and coughing. CT chest showed partial right renal mass vs lobulation. Pt denies history of dysuria, hematuria, renal calculus, unintentional weight loss. Also denies smoking history. Voiding well.     PAST MEDICAL & SURGICAL HISTORY:  Latent tuberculosis  Hyperlipidemia  HTN (hypertension)      MEDICATIONS  (STANDING):  albuterol/ipratropium for Nebulization. 3 milliLiter(s) Nebulizer every 6 hours  azithromycin  IVPB 500 milliGRAM(s) IV Intermittent every 24 hours  cefTRIAXone   IVPB 1000 milliGRAM(s) IV Intermittent every 24 hours  enoxaparin Injectable 70 milliGRAM(s) SubCutaneous two times a day  influenza   Vaccine 0.5 milliLiter(s) IntraMuscular once  metoprolol succinate ER 50 milliGRAM(s) Oral daily  simvastatin 40 milliGRAM(s) Oral at bedtime  venlafaxine XR. 150 milliGRAM(s) Oral daily    MEDICATIONS  (PRN):  acetaminophen   Tablet .. 650 milliGRAM(s) Oral every 6 hours PRN Mild Pain (1 - 3)  benzonatate 100 milliGRAM(s) Oral three times a day PRN Cough  guaiFENesin   Syrup  (Sugar-Free) 100 milliGRAM(s) Oral every 6 hours PRN Cough      Allergies    sulfa drugs (Rash)  Xylocaine 10% Oral (Short breath)    Vital Signs Last 24 Hrs  T(C): 37 (13 Jan 2020 05:09), Max: 37 (12 Jan 2020 15:37)  T(F): 98.6 (13 Jan 2020 05:09), Max: 98.6 (12 Jan 2020 15:37)  HR: 102 (13 Jan 2020 05:09) (102 - 108)  BP: 157/84 (13 Jan 2020 05:09) (149/70 - 157/84)  BP(mean): --  RR: 18 (13 Jan 2020 05:09) (17 - 18)  SpO2: 100% (13 Jan 2020 05:09) (98% - 100%)    Physical:  Gen: A&Ox3. NAD  Abd: Soft ND, NT  Back: No CVAT b/l    LABS:                        11.4   10.57 )-----------( 354      ( 13 Jan 2020 08:41 )             37.1              01-13    139  |  109<H>  |  17  ----------------------------<  97  3.6   |  22  |  0.76    Ca    8.6      13 Jan 2020 08:41  Mg     1.9     01-13    RADIOLOGY & ADDITIONAL STUDIES:  < from: CT Angio Chest w/ IV Cont (01.09.20 @ 15:43) >  VISUALIZED UPPER ABDOMEN: Partially imaged right upper pole renal mass measuring 3.7 cm or partially imaged prominent congenital lobulation.    < end of copied text >

## 2020-01-13 NOTE — PROGRESS NOTE ADULT - SUBJECTIVE AND OBJECTIVE BOX
Time of Visit:  Patient seen and examined.     MEDICATIONS  (STANDING):  albuterol/ipratropium for Nebulization. 3 milliLiter(s) Nebulizer every 6 hours  amLODIPine   Tablet 2.5 milliGRAM(s) Oral daily  azithromycin  IVPB 500 milliGRAM(s) IV Intermittent every 24 hours  cefTRIAXone   IVPB 1000 milliGRAM(s) IV Intermittent every 24 hours  enoxaparin Injectable 70 milliGRAM(s) SubCutaneous two times a day  influenza   Vaccine 0.5 milliLiter(s) IntraMuscular once  metoprolol succinate ER 50 milliGRAM(s) Oral daily  simvastatin 40 milliGRAM(s) Oral at bedtime  venlafaxine XR. 150 milliGRAM(s) Oral daily      MEDICATIONS  (PRN):  acetaminophen   Tablet .. 650 milliGRAM(s) Oral every 6 hours PRN Mild Pain (1 - 3)  benzonatate 100 milliGRAM(s) Oral three times a day PRN Cough  guaiFENesin   Syrup  (Sugar-Free) 100 milliGRAM(s) Oral every 6 hours PRN Cough       Medications up to date at time of exam.      PHYSICAL EXAMINATION:  Patient has no new complaints.  GENERAL: The patient is a well-developed, well-nourished, in no apparent distress.     Vital Signs Last 24 Hrs  T(C): 37 (13 Jan 2020 05:09), Max: 37 (12 Jan 2020 15:37)  T(F): 98.6 (13 Jan 2020 05:09), Max: 98.6 (12 Jan 2020 15:37)  HR: 102 (13 Jan 2020 05:09) (102 - 108)  BP: 157/84 (13 Jan 2020 05:09) (149/70 - 157/84)  BP(mean): --  RR: 18 (13 Jan 2020 05:09) (17 - 18)  SpO2: 100% (13 Jan 2020 05:09) (98% - 100%)   (if applicable)    Chest Tube (if applicable)    HEENT: Head is normocephalic and atraumatic. Extraocular muscles are intact. Mucous membranes are moist.     NECK: Supple, no palpable adenopathy.    LUNGS: Clear to auscultation, no wheezing, rales, or rhonchi.    HEART: Regular rate and rhythm without murmur.    ABDOMEN: Soft, nontender, and nondistended.  No hepatosplenomegaly is noted.    : No painful voiding, no pelvic pain    EXTREMITIES: Without any cyanosis, clubbing, rash, lesions or edema.    NEUROLOGIC: Awake, alert, oriented, grossly intact    SKIN: Warm, dry, good turgor.      LABS:                        11.4   10.57 )-----------( 354      ( 13 Jan 2020 08:41 )             37.1     01-13    139  |  109<H>  |  17  ----------------------------<  97  3.6   |  22  |  0.76    Ca    8.6      13 Jan 2020 08:41  Mg     1.9     01-13                          MICROBIOLOGY: (if applicable)    RADIOLOGY & ADDITIONAL STUDIES:  EKG:   CXR:  ECHO:    IMPRESSION: 69y Female PAST MEDICAL & SURGICAL HISTORY:  Latent tuberculosis  Hyperlipidemia  HTN (hypertension)   p/w         Impression; This is a  69 yr old  woman presented with cough with whitish sputum since 1 1/2 week which is getting worse. She had some blood on her sputum today.  She has been having low grade fever since same time period along with chills,  generalized weakness, decreased appetite, headache, back pain. Today when she went to [facility],  she felt like almost passing out and looked pale and clammy as per . Episode of SOB on exertion , Cough, Low grade Fever due to CXR showing Right lung Pneumonia and CT Chest with chronic Right Middle Lobe Pulmonary Embolus.    Echo is normal , no pul htn. If pat has chronic PE , there should be pul hypertension.. will need V/Q scan to confirm PE.  Maria Elena is going to live in [place] for 6 months and asked for all  work up to be done  . PAt is afebrile and WBC is trending down. Cultures neg. urology eval noted    Suggestion;    -V/Q scan pending  -continue anticoagulation until v/q result   -continue antibx  -duonebs q6h  -PT

## 2020-01-14 DIAGNOSIS — R06.02 SHORTNESS OF BREATH: ICD-10-CM

## 2020-01-14 LAB
ALBUMIN SERPL ELPH-MCNC: 2.5 G/DL — LOW (ref 3.5–5)
ALP SERPL-CCNC: 116 U/L — SIGNIFICANT CHANGE UP (ref 40–120)
ALT FLD-CCNC: 44 U/L DA — SIGNIFICANT CHANGE UP (ref 10–60)
ANION GAP SERPL CALC-SCNC: 6 MMOL/L — SIGNIFICANT CHANGE UP (ref 5–17)
AST SERPL-CCNC: 29 U/L — SIGNIFICANT CHANGE UP (ref 10–40)
BILIRUB SERPL-MCNC: 0.3 MG/DL — SIGNIFICANT CHANGE UP (ref 0.2–1.2)
BUN SERPL-MCNC: 18 MG/DL — SIGNIFICANT CHANGE UP (ref 7–18)
CALCIUM SERPL-MCNC: 8.6 MG/DL — SIGNIFICANT CHANGE UP (ref 8.4–10.5)
CHLORIDE SERPL-SCNC: 107 MMOL/L — SIGNIFICANT CHANGE UP (ref 96–108)
CO2 SERPL-SCNC: 27 MMOL/L — SIGNIFICANT CHANGE UP (ref 22–31)
CREAT SERPL-MCNC: 0.83 MG/DL — SIGNIFICANT CHANGE UP (ref 0.5–1.3)
GLUCOSE SERPL-MCNC: 102 MG/DL — HIGH (ref 70–99)
HCT VFR BLD CALC: 38.3 % — SIGNIFICANT CHANGE UP (ref 34.5–45)
HGB BLD-MCNC: 11.7 G/DL — SIGNIFICANT CHANGE UP (ref 11.5–15.5)
MCHC RBC-ENTMCNC: 26.5 PG — LOW (ref 27–34)
MCHC RBC-ENTMCNC: 30.5 GM/DL — LOW (ref 32–36)
MCV RBC AUTO: 86.7 FL — SIGNIFICANT CHANGE UP (ref 80–100)
NRBC # BLD: 0 /100 WBCS — SIGNIFICANT CHANGE UP (ref 0–0)
PLATELET # BLD AUTO: 430 K/UL — HIGH (ref 150–400)
POTASSIUM SERPL-MCNC: 4 MMOL/L — SIGNIFICANT CHANGE UP (ref 3.5–5.3)
POTASSIUM SERPL-SCNC: 4 MMOL/L — SIGNIFICANT CHANGE UP (ref 3.5–5.3)
PROT SERPL-MCNC: 7 G/DL — SIGNIFICANT CHANGE UP (ref 6–8.3)
RBC # BLD: 4.42 M/UL — SIGNIFICANT CHANGE UP (ref 3.8–5.2)
RBC # FLD: 15.5 % — HIGH (ref 10.3–14.5)
SODIUM SERPL-SCNC: 140 MMOL/L — SIGNIFICANT CHANGE UP (ref 135–145)
WBC # BLD: 13.65 K/UL — HIGH (ref 3.8–10.5)
WBC # FLD AUTO: 13.65 K/UL — HIGH (ref 3.8–10.5)

## 2020-01-14 PROCEDURE — 99222 1ST HOSP IP/OBS MODERATE 55: CPT

## 2020-01-14 PROCEDURE — 78582 LUNG VENTILAT&PERFUS IMAGING: CPT | Mod: 26

## 2020-01-14 PROCEDURE — 74178 CT ABD&PLV WO CNTR FLWD CNTR: CPT | Mod: 26

## 2020-01-14 RX ORDER — HYDROMORPHONE HYDROCHLORIDE 2 MG/ML
0.25 INJECTION INTRAMUSCULAR; INTRAVENOUS; SUBCUTANEOUS ONCE
Refills: 0 | Status: DISCONTINUED | OUTPATIENT
Start: 2020-01-14 | End: 2020-01-14

## 2020-01-14 RX ORDER — ENOXAPARIN SODIUM 100 MG/ML
40 INJECTION SUBCUTANEOUS DAILY
Refills: 0 | Status: DISCONTINUED | OUTPATIENT
Start: 2020-01-15 | End: 2020-01-16

## 2020-01-14 RX ORDER — AMLODIPINE BESYLATE 2.5 MG/1
5 TABLET ORAL DAILY
Refills: 0 | Status: DISCONTINUED | OUTPATIENT
Start: 2020-01-14 | End: 2020-01-16

## 2020-01-14 RX ADMIN — HYDROMORPHONE HYDROCHLORIDE 0.25 MILLIGRAM(S): 2 INJECTION INTRAMUSCULAR; INTRAVENOUS; SUBCUTANEOUS at 09:20

## 2020-01-14 RX ADMIN — HYDROMORPHONE HYDROCHLORIDE 0.25 MILLIGRAM(S): 2 INJECTION INTRAMUSCULAR; INTRAVENOUS; SUBCUTANEOUS at 12:49

## 2020-01-14 RX ADMIN — Medication 3 MILLILITER(S): at 14:37

## 2020-01-14 RX ADMIN — Medication 50 MILLIGRAM(S): at 05:52

## 2020-01-14 RX ADMIN — Medication 100 MILLIGRAM(S): at 21:32

## 2020-01-14 RX ADMIN — Medication 3 MILLILITER(S): at 21:27

## 2020-01-14 RX ADMIN — Medication 3 MILLILITER(S): at 08:26

## 2020-01-14 RX ADMIN — Medication 30 MILLIGRAM(S): at 04:05

## 2020-01-14 RX ADMIN — Medication 150 MILLIGRAM(S): at 12:49

## 2020-01-14 RX ADMIN — AMLODIPINE BESYLATE 5 MILLIGRAM(S): 2.5 TABLET ORAL at 21:32

## 2020-01-14 RX ADMIN — Medication 30 MILLIGRAM(S): at 18:59

## 2020-01-14 RX ADMIN — Medication 100 MILLIGRAM(S): at 05:51

## 2020-01-14 RX ADMIN — Medication 3 MILLILITER(S): at 02:00

## 2020-01-14 RX ADMIN — CEFTRIAXONE 100 MILLIGRAM(S): 500 INJECTION, POWDER, FOR SOLUTION INTRAMUSCULAR; INTRAVENOUS at 17:44

## 2020-01-14 RX ADMIN — ENOXAPARIN SODIUM 70 MILLIGRAM(S): 100 INJECTION SUBCUTANEOUS at 05:52

## 2020-01-14 RX ADMIN — SIMVASTATIN 40 MILLIGRAM(S): 20 TABLET, FILM COATED ORAL at 21:31

## 2020-01-14 RX ADMIN — AMLODIPINE BESYLATE 2.5 MILLIGRAM(S): 2.5 TABLET ORAL at 05:52

## 2020-01-14 RX ADMIN — Medication 30 MILLIGRAM(S): at 05:30

## 2020-01-14 RX ADMIN — Medication 30 MILLIGRAM(S): at 15:35

## 2020-01-14 NOTE — PROGRESS NOTE ADULT - SUBJECTIVE AND OBJECTIVE BOX
HPI:  Pt is a 70 y/o F with PMH of HTN, HLD, Latent TB PSH of rt. knee surgery who presented with cough, runny nose, weakness and fever since 1 and half week.  According to  the pt, she started developing cough with whitish sputum since 1 1/2 week which is getting worse. She had some blood on her sputum today.  She has been having low grade fever since same time period along with chills,  generalized weakness, decreased appetite, headache, back pain. Today when she went to [facility],  she felt like almost passing out and looked pale and clammy as per .  She also complained of burning sensation while passing urine  and occasional urinary incontinence. (09 Jan 2020 21:54)      Patient is a 69y old  Female who presents with a chief complaint of Cough, fever, (14 Jan 2020 11:23)      INTERVAL HPI/OVERNIGHT EVENTS:  T(C): 36.5 (01-14-20 @ 05:20), Max: 36.9 (01-13-20 @ 20:18)  HR: 88 (01-14-20 @ 09:33) (88 - 104)  BP: 147/82 (01-14-20 @ 05:20) (147/82 - 167/73)  RR: 18 (01-14-20 @ 05:20) (18 - 18)  SpO2: 96% (01-14-20 @ 09:33) (95% - 100%)  Wt(kg): --  I&O's Summary      REVIEW OF SYSTEMS: denies fever, chills, SOB, palpitations, chest pain, abdominal pain, nausea, vomitting, diarrhea, constipation, dizziness    MEDICATIONS  (STANDING):  albuterol/ipratropium for Nebulization. 3 milliLiter(s) Nebulizer every 6 hours  amLODIPine   Tablet 5 milliGRAM(s) Oral daily  cefTRIAXone   IVPB 1000 milliGRAM(s) IV Intermittent every 24 hours  influenza   Vaccine 0.5 milliLiter(s) IntraMuscular once  metoprolol succinate ER 50 milliGRAM(s) Oral daily  simvastatin 40 milliGRAM(s) Oral at bedtime  sodium chloride 0.9%. 1000 milliLiter(s) (80 mL/Hr) IV Continuous <Continuous>  venlafaxine XR. 150 milliGRAM(s) Oral daily    MEDICATIONS  (PRN):  acetaminophen   Tablet .. 650 milliGRAM(s) Oral every 6 hours PRN Mild Pain (1 - 3)  benzonatate 100 milliGRAM(s) Oral three times a day PRN Cough  guaiFENesin   Syrup  (Sugar-Free) 100 milliGRAM(s) Oral every 6 hours PRN Cough  ketorolac   Injectable 30 milliGRAM(s) IV Push every 8 hours PRN Severe Pain (7 - 10)      PHYSICAL EXAM:  GENERAL: NAD, well-groomed, well-developed  HEAD:  Atraumatic, Normocephalic  EYES: EOMI, PERRLA, conjunctiva and sclera clear  ENMT: No tonsillar erythema, exudates, or enlargement; Moist mucous membranes, Good dentition, No lesions  NECK: Supple, No JVD, Normal thyroid  NERVOUS SYSTEM:  Alert & Oriented X3, Good concentration; Motor Strength 5/5 B/L upper and lower extremities; DTRs 2+ intact and symmetric  CHEST/LUNG: Clear to percussion bilaterally; No rales, rhonchi, wheezing, or rubs  HEART: Regular rate and rhythm; No murmurs, rubs, or gallops  ABDOMEN: Soft, Nontender, Nondistended; Bowel sounds present  EXTREMITIES:  2+ Peripheral Pulses, No clubbing, cyanosis, or edema  LYMPH: No lymphadenopathy noted  SKIN: No rashes or lesions  LABS:                        11.7   13.65 )-----------( 430      ( 14 Jan 2020 08:39 )             38.3     01-14    140  |  107  |  18  ----------------------------<  102<H>  4.0   |  27  |  0.83    Ca    8.6      14 Jan 2020 08:39  Mg     1.9     01-13    TPro  7.0  /  Alb  2.5<L>  /  TBili  0.3  /  DBili  x   /  AST  29  /  ALT  44  /  AlkPhos  116  01-14        CAPILLARY BLOOD GLUCOSE

## 2020-01-14 NOTE — PROGRESS NOTE ADULT - ASSESSMENT
seen and examined  on bed having lunch  comfortable  not in any distress   no complaints  not in cp or sob  still has bouts of cough on and off   no fever    vsstable  physical done  lungs b/l wheeze  heart abd benign  wbc 13  but afebrile   vq scan neg  ca abd  renal mass  , rt sided large pneumonia    a/p large pneumonia   wbc 13 but no fever  clinically improving  will get sputum cxs  vq scan neg  pulmonary was d/w resident lovenox full dose was d/cd   renal mass   on ct  UROLOGY to f/u

## 2020-01-14 NOTE — PROGRESS NOTE ADULT - ASSESSMENT
70 yo F admitted for pneumonia, incidental right renal mass    - Reviewed imaging and discussed findings with pt  - Reviewed potential options including partial vs radical nephrectomy.  - Pt actually lives in [place] and is in town for only a few more weeks. Reassured pt that waiting until she returns home to take care of the mass has very little to no risk. Would need to wait anyway to proceed with any sort of intervention given her pneumonia.  - Follow-up as outpt with either myself or with a urologist in [place].

## 2020-01-14 NOTE — PROGRESS NOTE ADULT - PROBLEM SELECTOR PLAN 2
improving with duoneb   due to SOB and questionable finding from CTA, VQ scan was performed today --- result - very low probability of pulmonary embolism -- discussed with Dr. Lindsey --> d/soha lovenox    c/w supplemental O2

## 2020-01-14 NOTE — PROGRESS NOTE ADULT - PROBLEM SELECTOR PLAN 1
c/w ceftriaxone  D6 and s/p  zithromax IV ABT  course   afebrile  c/w supplemental O2 for SOB  c/w amadob   Dr. Lindsey is following. c/w ceftriaxone  D6 and s/p  zithromax IV ABT  course   afebrile  c/w supplemental O2 for SOB -- Dr. tapia asked to do sputum culture for persist SOB   c/w rian Lindsey is following.

## 2020-01-14 NOTE — PROGRESS NOTE ADULT - SUBJECTIVE AND OBJECTIVE BOX
Currently denies any significant symptoms. Denies any gross hematuria, flank pain.    Vital Signs Last 24 Hrs  T(C): 36.6 (14 Jan 2020 14:25), Max: 36.9 (13 Jan 2020 20:18)  T(F): 97.9 (14 Jan 2020 14:25), Max: 98.5 (13 Jan 2020 20:18)  HR: 86 (14 Jan 2020 14:44) (86 - 96)  BP: 151/85 (14 Jan 2020 14:25) (147/82 - 151/85)  BP(mean): --  RR: 18 (14 Jan 2020 14:25) (18 - 18)  SpO2: 95% (14 Jan 2020 14:44) (94% - 100%)    Abd: soft, nt/nd                          11.7   13.65 )-----------( 430      ( 14 Jan 2020 08:39 )             38.3   01-14    140  |  107  |  18  ----------------------------<  102<H>  4.0   |  27  |  0.83    Ca    8.6      14 Jan 2020 08:39  Mg     1.9     01-13    TPro  7.0  /  Alb  2.5<L>  /  TBili  0.3  /  DBili  x   /  AST  29  /  ALT  44  /  AlkPhos  116  01-14    EXAM:  CT ABDOMEN AND PELVIS [facility]                        PROCEDURE DATE:  01/14/2020    FINDINGS:    LOWER CHEST: Dense consolidation right lower lobe with small right pleural effusion. Mild left lower lobe discoid atelectasis. Trace right pleural effusion. No left pleural effusion.    LIVER: Within normal limits.  BILE DUCTS: Normal caliber.  GALLBLADDER: Within normal limits.  SPLEEN: Within normal limits.  PANCREAS: Within normal limits.  ADRENALS: Within normal limits.  KIDNEYS/URETERS: 4.1 x 3.9 x 3.9 cm exophytic medial right upper pole renal mass with approximately 2.4 cm central necrosis. Enhancing rind. The finding is suspicious for necrotic renal cell carcinoma. No renal vein or IVC involvement. There are scattered bilateral less than 5 mm hypodensities too small to characterize. There is no hydroureteronephrosis or upper ureteral calculus.    BLADDER: Within normal limits.  REPRODUCTIVE ORGANS: Unremarkable uterus.    BOWEL: No bowel obstruction. Appendix is normal.. Mild sigmoid diverticulosis without diverticulitis.  PERITONEUM: No ascites.  VESSELS: The aorta and iliac arteries are atherosclerotic but not aneurysmal.  RETROPERITONEUM/LYMPH NODES: No lymphadenopathy.    ABDOMINAL WALL: Minimal fat-containing umbilical hernia.  BONES: Thoracolumbar degenerative changes.    IMPRESSION:     4.1 x 3.9 x 3.9 cm exophytic right upper pole renal mass with a chronic centimeters suspicious for renal cell carcinoma.    Dense right lower lobe pneumonia with small right pleural effusion.    Other chronic findings as above.

## 2020-01-14 NOTE — PROGRESS NOTE ADULT - SUBJECTIVE AND OBJECTIVE BOX
Time of Visit:  Patient seen and examined.     MEDICATIONS  (STANDING):  albuterol/ipratropium for Nebulization. 3 milliLiter(s) Nebulizer every 6 hours  amLODIPine   Tablet 5 milliGRAM(s) Oral daily  cefTRIAXone   IVPB 1000 milliGRAM(s) IV Intermittent every 24 hours  influenza   Vaccine 0.5 milliLiter(s) IntraMuscular once  metoprolol succinate ER 50 milliGRAM(s) Oral daily  simvastatin 40 milliGRAM(s) Oral at bedtime  sodium chloride 0.9%. 1000 milliLiter(s) (80 mL/Hr) IV Continuous <Continuous>  venlafaxine XR. 150 milliGRAM(s) Oral daily      MEDICATIONS  (PRN):  acetaminophen   Tablet .. 650 milliGRAM(s) Oral every 6 hours PRN Mild Pain (1 - 3)  benzonatate 100 milliGRAM(s) Oral three times a day PRN Cough  guaiFENesin   Syrup  (Sugar-Free) 100 milliGRAM(s) Oral every 6 hours PRN Cough  ketorolac   Injectable 30 milliGRAM(s) IV Push every 8 hours PRN Severe Pain (7 - 10)       Medications up to date at time of exam.      PHYSICAL EXAMINATION:  Patient has no new complaints.  GENERAL: The patient is a well-developed, well-nourished, in no apparent distress.     Vital Signs Last 24 Hrs  T(C): 36.6 (14 Jan 2020 14:25), Max: 36.9 (13 Jan 2020 20:18)  T(F): 97.9 (14 Jan 2020 14:25), Max: 98.5 (13 Jan 2020 20:18)  HR: 86 (14 Jan 2020 14:44) (86 - 96)  BP: 151/85 (14 Jan 2020 14:25) (147/82 - 151/85)  BP(mean): --  RR: 18 (14 Jan 2020 14:25) (18 - 18)  SpO2: 95% (14 Jan 2020 14:44) (94% - 100%)   (if applicable)    Chest Tube (if applicable)    HEENT: Head is normocephalic and atraumatic. Extraocular muscles are intact. Mucous membranes are moist.     NECK: Supple, no palpable adenopathy.    LUNGS: Clear to auscultation, no wheezing, rales, or rhonchi.    HEART: Regular rate and rhythm without murmur.    ABDOMEN: Soft, nontender, and nondistended.  No hepatosplenomegaly is noted.    : No painful voiding, no pelvic pain    EXTREMITIES: Without any cyanosis, clubbing, rash, lesions or edema.    NEUROLOGIC: Awake, alert, oriented, grossly intact    SKIN: Warm, dry, good turgor.      LABS:                        11.7   13.65 )-----------( 430      ( 14 Jan 2020 08:39 )             38.3     01-14    140  |  107  |  18  ----------------------------<  102<H>  4.0   |  27  |  0.83    Ca    8.6      14 Jan 2020 08:39  Mg     1.9     01-13    TPro  7.0  /  Alb  2.5<L>  /  TBili  0.3  /  DBili  x   /  AST  29  /  ALT  44  /  AlkPhos  116  01-14                        MICROBIOLOGY: (if applicable)    RADIOLOGY & ADDITIONAL STUDIES:  EKG:   V/Q scan;  < from: NM Pulmonary Ventilation/Perfusion Scan (01.14.20 @ 10:54) >    EXAM:  NM PULM VENTILATION PERFUS IMG                            PROCEDURE DATE:  01/14/2020          INTERPRETATION:  CLINICAL INFORMATION: 69 year-old female with shortness of breath, referred to evaluate for pulmonary embolism.    RADIOPHARMACEUTICAL: 1 mCi Tc-99m-DTPA by inhalation; 6.1 mCi Tc-99m-MAA, I.V.    TECHNIQUE:  Ventilation and perfusion images of the lungs were obtained following administration of Tc-99m-DTPA and Tc-99m-MAA. Images were obtained in the anterior, posterior, both lateral, and all 4 oblique projections. This study was interpreted in conjunction with a chest radiograph of 1/13/2020    COMPARISON: No previous lung V/Q scan for comparison     FINDINGS: There is heterogeneous radiotracer distribution in the lungs onboth the ventilation and the perfusion images. There is no segmental perfusion defect.      IMPRESSION:  Very low probability of pulmonary embolus.                            MARBIN OROZCO M.D., NUCLEAR MEDICINE ATTENDING  This document has been electronically signed. Jan 14 2020 11:04AM                < end of copied text >  ECHO:    IMPRESSION: 69y Female PAST MEDICAL & SURGICAL HISTORY:  Latent tuberculosis  Hyperlipidemia  HTN (hypertension)   p/w           Impression; This is a  69 yr old  woman presented with cough with whitish sputum since 1 1/2 week which is getting worse. She had some blood on her sputum today.  She has been having low grade fever since same time period along with chills,  generalized weakness, decreased appetite, headache, back pain. Today when she went to [facility],  she felt like almost passing out and looked pale and clammy as per . Episode of SOB on exertion , Cough, Low grade Fever due to CXR showing Right lung Pneumonia and CT Chest with chronic Right Middle Lobe Pulmonary Embolus.    Echo is normal , no pul htn. If pat has chronic PE , there should be pul hypertension.. will need V/Q scan to confirm PE.  Maria Elena is going to live in [place] for 6 months and asked for all  work up to be done  . PAt is afebrile and WBC is trending down. Cultures neg. urology eval noted.. renal  mass will need nephrectomy after pna resolve. V/Q scan low prob for PE     Suggestion;    -change therapeutic anticoag to prophy  -continue antibx  -duonebs q6h  -PT

## 2020-01-14 NOTE — PROGRESS NOTE ADULT - PROBLEM SELECTOR PLAN 3
(+) E. Coli UTI - sensitive to ceftriaxone  c/w ceftriaxone D6 - Dr. Chino is following   asymptomatic

## 2020-01-14 NOTE — PROGRESS NOTE ADULT - PROBLEM SELECTOR PLAN 4
CT as above  came in with Rt flank/ back pain  c/w pain mgmt  Urology consulted - CT A/P w/wo contrast ordered  -- f/u with Ct result CT as above  came in with Rt flank/ back pain  c/w pain mgmt  Urology consulted - CT A/P w/wo contrast done and informed urology -- f/u with uro consultation

## 2020-01-15 LAB
ANION GAP SERPL CALC-SCNC: 7 MMOL/L — SIGNIFICANT CHANGE UP (ref 5–17)
BUN SERPL-MCNC: 20 MG/DL — HIGH (ref 7–18)
CALCIUM SERPL-MCNC: 8.7 MG/DL — SIGNIFICANT CHANGE UP (ref 8.4–10.5)
CHLORIDE SERPL-SCNC: 111 MMOL/L — HIGH (ref 96–108)
CO2 SERPL-SCNC: 26 MMOL/L — SIGNIFICANT CHANGE UP (ref 22–31)
CREAT SERPL-MCNC: 0.85 MG/DL — SIGNIFICANT CHANGE UP (ref 0.5–1.3)
CULTURE RESULTS: SIGNIFICANT CHANGE UP
GLUCOSE SERPL-MCNC: 94 MG/DL — SIGNIFICANT CHANGE UP (ref 70–99)
GRAM STN FLD: SIGNIFICANT CHANGE UP
HCT VFR BLD CALC: 35.6 % — SIGNIFICANT CHANGE UP (ref 34.5–45)
HGB BLD-MCNC: 10.9 G/DL — LOW (ref 11.5–15.5)
MCHC RBC-ENTMCNC: 27.1 PG — SIGNIFICANT CHANGE UP (ref 27–34)
MCHC RBC-ENTMCNC: 30.6 GM/DL — LOW (ref 32–36)
MCV RBC AUTO: 88.6 FL — SIGNIFICANT CHANGE UP (ref 80–100)
NRBC # BLD: 0 /100 WBCS — SIGNIFICANT CHANGE UP (ref 0–0)
PLATELET # BLD AUTO: 394 K/UL — SIGNIFICANT CHANGE UP (ref 150–400)
POTASSIUM SERPL-MCNC: 4.5 MMOL/L — SIGNIFICANT CHANGE UP (ref 3.5–5.3)
POTASSIUM SERPL-SCNC: 4.5 MMOL/L — SIGNIFICANT CHANGE UP (ref 3.5–5.3)
RBC # BLD: 4.02 M/UL — SIGNIFICANT CHANGE UP (ref 3.8–5.2)
RBC # FLD: 15.6 % — HIGH (ref 10.3–14.5)
SODIUM SERPL-SCNC: 144 MMOL/L — SIGNIFICANT CHANGE UP (ref 135–145)
SPECIMEN SOURCE: SIGNIFICANT CHANGE UP
SPECIMEN SOURCE: SIGNIFICANT CHANGE UP
WBC # BLD: 11.86 K/UL — HIGH (ref 3.8–10.5)
WBC # FLD AUTO: 11.86 K/UL — HIGH (ref 3.8–10.5)

## 2020-01-15 RX ADMIN — ENOXAPARIN SODIUM 40 MILLIGRAM(S): 100 INJECTION SUBCUTANEOUS at 12:21

## 2020-01-15 RX ADMIN — Medication 3 MILLILITER(S): at 20:20

## 2020-01-15 RX ADMIN — CEFTRIAXONE 100 MILLIGRAM(S): 500 INJECTION, POWDER, FOR SOLUTION INTRAMUSCULAR; INTRAVENOUS at 17:41

## 2020-01-15 RX ADMIN — AMLODIPINE BESYLATE 5 MILLIGRAM(S): 2.5 TABLET ORAL at 06:04

## 2020-01-15 RX ADMIN — Medication 30 MILLIGRAM(S): at 20:06

## 2020-01-15 RX ADMIN — Medication 150 MILLIGRAM(S): at 12:21

## 2020-01-15 RX ADMIN — Medication 30 MILLIGRAM(S): at 19:36

## 2020-01-15 RX ADMIN — Medication 3 MILLILITER(S): at 14:40

## 2020-01-15 RX ADMIN — Medication 650 MILLIGRAM(S): at 12:34

## 2020-01-15 RX ADMIN — Medication 30 MILLIGRAM(S): at 00:41

## 2020-01-15 RX ADMIN — Medication 50 MILLIGRAM(S): at 06:04

## 2020-01-15 RX ADMIN — Medication 100 MILLIGRAM(S): at 22:09

## 2020-01-15 RX ADMIN — Medication 3 MILLILITER(S): at 08:16

## 2020-01-15 RX ADMIN — SIMVASTATIN 40 MILLIGRAM(S): 20 TABLET, FILM COATED ORAL at 22:09

## 2020-01-15 RX ADMIN — Medication 650 MILLIGRAM(S): at 13:30

## 2020-01-15 RX ADMIN — Medication 30 MILLIGRAM(S): at 01:11

## 2020-01-15 NOTE — PROGRESS NOTE ADULT - SUBJECTIVE AND OBJECTIVE BOX
69y Female is under our care for     REVIEW OF SYSTEMS:  [  ] Not able to illicit  General:	  Chest:	  GI:	  :  Skin:	  Musculoskeletal:	  Neuro:	    MEDS:  cefTRIAXone   IVPB 1000 milliGRAM(s) IV Intermittent every 24 hours    ALLERGIES: Allergies    sulfa drugs (Rash)  Xylocaine 10% Oral (Short breath)    Intolerances        VITALS:  Vital Signs Last 24 Hrs  T(C): 36.8 (15 Richie 2020 13:55), Max: 36.8 (14 Jan 2020 20:32)  T(F): 98.2 (15 Richie 2020 13:55), Max: 98.2 (14 Jan 2020 20:32)  HR: 90 (15 Richie 2020 15:16) (87 - 94)  BP: 170/71 (15 Richie 2020 13:55) (147/84 - 170/71)  BP(mean): --  RR: 18 (15 Richie 2020 13:55) (17 - 18)  SpO2: 96% (15 Richie 2020 15:16) (94% - 97%)      PHYSICAL EXAM:  HEENT:  Neck:  Respiratory:  Cardiovascular:  Gastrointestinal:  Extremities:  Skin:  Ortho:  Neuro:    LABS/DIAGNOSTIC TESTS:                        10.9   11.86 )-----------( 394      ( 15 Richie 2020 07:02 )             35.6     WBC Count: 11.86 K/uL (01-15 @ 07:02)  WBC Count: 13.65 K/uL (01-14 @ 08:39)  WBC Count: 10.57 K/uL (01-13 @ 08:41)  WBC Count: 13.71 K/uL (01-12 @ 08:14)  WBC Count: 16.76 K/uL (01-11 @ 08:32)    01-15    144  |  111<H>  |  20<H>  ----------------------------<  94  4.5   |  26  |  0.85    Ca    8.7      15 Richie 2020 07:02    TPro  7.0  /  Alb  2.5<L>  /  TBili  0.3  /  DBili  x   /  AST  29  /  ALT  44  /  AlkPhos  116  01-14      CULTURES:   .Sputum  01-15 @ 01:30 --  --    Few polymorphonuclear leukocytes per low power field  Few Squamous epithelial cells per low power field  Few Yeast like cells seen per oil power field  Few Gram Negative Rods seen per oil power field  Few Gram Positive Cocci in Pairs and Chains seen per oil power field  Few Gram Positive Cocci in Clusters seen per oil power field      .Blood  01-10 @ 14:37   No growth at 5 days.  --  --      .Urine  01-10 @ 00:35   >100,000 CFU/ml Escherichia coli  --  Escherichia coli        RADIOLOGY:  no new studies 69y Female is under our care for pneumonia (CAP) and leukocytosis. Patient admits she is feeling better and reports that cough, phlegm quantity and associated CP have decreased. Also still having right upper back pain. Found to have right upper pole renal mass which is suspicious for renal cell carcinoma; patient will follow up on this as an outpatient. Dc planning tomorrow on no antibiotics once am rocephin dose is given.     REVIEW OF SYSTEMS:  [  ] Not able to illicit  General: no fevers no malaise  Chest: +improved cough no sob +improved sputum production  GI: no nvd  : no urinary sxs   Skin: no rashes  Musculoskeletal: no trauma +UBP  Neuro: no ha's no dizziness     MEDS:  cefTRIAXone   IVPB 1000 milliGRAM(s) IV Intermittent every 24 hours    ALLERGIES: Allergies    sulfa drugs (Rash)  Xylocaine 10% Oral (Short breath)    Intolerances      VITALS:  Vital Signs Last 24 Hrs  T(C): 36.8 (15 Richie 2020 13:55), Max: 36.8 (14 Jan 2020 20:32)  T(F): 98.2 (15 Richie 2020 13:55), Max: 98.2 (14 Jan 2020 20:32)  HR: 90 (15 Richie 2020 15:16) (87 - 94)  BP: 170/71 (15 Richie 2020 13:55) (147/84 - 170/71)  BP(mean): --  RR: 18 (15 Richie 2020 13:55) (17 - 18)  SpO2: 96% (15 Richie 2020 15:16) (94% - 97%)      PHYSICAL EXAM:  HEENT: n/a  Neck: supple no LN's   Respiratory: lungs sound clear, but distant no rales  Cardiovascular: S1 S2 reg no murmurs  Gastrointestinal: +BS with soft, large abdominal pannus; nontender  Extremities: trace pitting edema of ankles  Skin: no rashes  Ortho: soreness over right sided thoracic region   Neuro: awake and alert      LABS/DIAGNOSTIC TESTS:                        10.9   11.86 )-----------( 394      ( 15 Richie 2020 07:02 )             35.6     WBC Count: 11.86 K/uL (01-15 @ 07:02)  WBC Count: 13.65 K/uL (01-14 @ 08:39)  WBC Count: 10.57 K/uL (01-13 @ 08:41)  WBC Count: 13.71 K/uL (01-12 @ 08:14)  WBC Count: 16.76 K/uL (01-11 @ 08:32)    01-15    144  |  111<H>  |  20<H>  ----------------------------<  94  4.5   |  26  |  0.85    Ca    8.7      15 Richie 2020 07:02    TPro  7.0  /  Alb  2.5<L>  /  TBili  0.3  /  DBili  x   /  AST  29  /  ALT  44  /  AlkPhos  116  01-14      CULTURES:   .Sputum  01-15 @ 01:30 --  --    Few polymorphonuclear leukocytes per low power field  Few Squamous epithelial cells per low power field  Few Yeast like cells seen per oil power field  Few Gram Negative Rods seen per oil power field  Few Gram Positive Cocci in Pairs and Chains seen per oil power field  Few Gram Positive Cocci in Clusters seen per oil power field      .Blood  01-10 @ 14:37   No growth at 5 days.  --  --      .Urine  01-10 @ 00:35   >100,000 CFU/ml Escherichia coli  --  Escherichia coli        RADIOLOGY:  no new studies 69y Female is under our care for pneumonia (CAP) and leukocytosis. Patient admits she is feeling better and reports that cough, phlegm quantity and associated CP have decreased. Also still having right upper back pain. Found on CT A/P to have right upper pole renal mass which is suspicious for renal cell carcinoma; patient will follow up on this as an outpatient. Dc planning tomorrow on no antibiotics once am rocephin dose is given.     REVIEW OF SYSTEMS:  [  ] Not able to illicit  General: no fevers no malaise  Chest: +improved cough no sob +improved sputum production  GI: no nvd  : no urinary sxs   Skin: no rashes  Musculoskeletal: no trauma +UBP  Neuro: no ha's no dizziness     MEDS:  cefTRIAXone   IVPB 1000 milliGRAM(s) IV Intermittent every 24 hours    ALLERGIES: Allergies    sulfa drugs (Rash)  Xylocaine 10% Oral (Short breath)    Intolerances      VITALS:  Vital Signs Last 24 Hrs  T(C): 36.8 (15 Richie 2020 13:55), Max: 36.8 (14 Jan 2020 20:32)  T(F): 98.2 (15 Richie 2020 13:55), Max: 98.2 (14 Jan 2020 20:32)  HR: 90 (15 Richie 2020 15:16) (87 - 94)  BP: 170/71 (15 Richie 2020 13:55) (147/84 - 170/71)  BP(mean): --  RR: 18 (15 Richie 2020 13:55) (17 - 18)  SpO2: 96% (15 Richie 2020 15:16) (94% - 97%)      PHYSICAL EXAM:  HEENT: n/a  Neck: supple no LN's   Respiratory: lungs sound clear, but distant no rales  Cardiovascular: S1 S2 reg no murmurs  Gastrointestinal: +BS with soft, large abdominal pannus; nontender  Extremities: trace pitting edema of ankles  Skin: no rashes  Ortho: soreness over right sided thoracic region   Neuro: awake and alert      LABS/DIAGNOSTIC TESTS:                        10.9   11.86 )-----------( 394      ( 15 Richie 2020 07:02 )             35.6     WBC Count: 11.86 K/uL (01-15 @ 07:02)  WBC Count: 13.65 K/uL (01-14 @ 08:39)  WBC Count: 10.57 K/uL (01-13 @ 08:41)  WBC Count: 13.71 K/uL (01-12 @ 08:14)  WBC Count: 16.76 K/uL (01-11 @ 08:32)    01-15    144  |  111<H>  |  20<H>  ----------------------------<  94  4.5   |  26  |  0.85    Ca    8.7      15 Richie 2020 07:02    TPro  7.0  /  Alb  2.5<L>  /  TBili  0.3  /  DBili  x   /  AST  29  /  ALT  44  /  AlkPhos  116  01-14      CULTURES:   .Sputum  01-15 @ 01:30 --  --    Few polymorphonuclear leukocytes per low power field  Few Squamous epithelial cells per low power field  Few Yeast like cells seen per oil power field  Few Gram Negative Rods seen per oil power field  Few Gram Positive Cocci in Pairs and Chains seen per oil power field  Few Gram Positive Cocci in Clusters seen per oil power field      .Blood  01-10 @ 14:37   No growth at 5 days.  --  --      .Urine  01-10 @ 00:35   >100,000 CFU/ml Escherichia coli  --  Escherichia coli        RADIOLOGY:  no new studies

## 2020-01-15 NOTE — PROGRESS NOTE ADULT - ASSESSMENT
francia nd examined vsstable afebrile physical unchaged  no cp or sob or palp. bm ok   labs noted  wbc umm to 11  a/p pneumonia  d/w ID for po antibxs , if pt is ready. if possible  p is staurating well , walking around   no need of o@ pulm on board   renal mass as per urology    [facility]  she will going in 1-2 weeks back   urology ok for that  needs surgery,  pt is completly asymtomatic no pain, no hematurea.

## 2020-01-15 NOTE — PROGRESS NOTE ADULT - SUBJECTIVE AND OBJECTIVE BOX
HPI:  Pt is a 70 y/o F with PMH of HTN, HLD, Latent TB PSH of rt. knee surgery who presented with cough, runny nose, weakness and fever since 1 and half week.  According to  the pt, she started developing cough with whitish sputum since 1 1/2 week which is getting worse. She had some blood on her sputum today.  She has been having low grade fever since same time period along with chills,  generalized weakness, decreased appetite, headache, back pain. Today when she went to [facility],  she felt like almost passing out and looked pale and clammy as per .  She also complained of burning sensation while passing urine  and occasional urinary incontinence. (09 Jan 2020 21:54)      Patient is a 69y old  Female who presents with a chief complaint of Cough, fever, (14 Jan 2020 16:24)      INTERVAL HPI/OVERNIGHT EVENTS:  T(C): 36.6 (01-15-20 @ 05:40), Max: 36.8 (01-14-20 @ 20:32)  HR: 87 (01-15-20 @ 09:50) (86 - 96)  BP: 147/84 (01-15-20 @ 05:40) (147/84 - 153/84)  RR: 17 (01-15-20 @ 05:40) (17 - 18)  SpO2: 95% (01-15-20 @ 12:36) (94% - 97%)  Wt(kg): --  I&O's Summary      REVIEW OF SYSTEMS: denies fever, chills, SOB, palpitations, chest pain, abdominal pain, nausea, vomitting, diarrhea, constipation, dizziness    MEDICATIONS  (STANDING):  albuterol/ipratropium for Nebulization. 3 milliLiter(s) Nebulizer every 6 hours  amLODIPine   Tablet 5 milliGRAM(s) Oral daily  cefTRIAXone   IVPB 1000 milliGRAM(s) IV Intermittent every 24 hours  enoxaparin Injectable 40 milliGRAM(s) SubCutaneous daily  influenza   Vaccine 0.5 milliLiter(s) IntraMuscular once  metoprolol succinate ER 50 milliGRAM(s) Oral daily  simvastatin 40 milliGRAM(s) Oral at bedtime  sodium chloride 0.9%. 1000 milliLiter(s) (80 mL/Hr) IV Continuous <Continuous>  venlafaxine XR. 150 milliGRAM(s) Oral daily    MEDICATIONS  (PRN):  acetaminophen   Tablet .. 650 milliGRAM(s) Oral every 6 hours PRN Mild Pain (1 - 3)  benzonatate 100 milliGRAM(s) Oral three times a day PRN Cough  guaiFENesin   Syrup  (Sugar-Free) 100 milliGRAM(s) Oral every 6 hours PRN Cough  ketorolac   Injectable 30 milliGRAM(s) IV Push every 8 hours PRN Severe Pain (7 - 10)      PHYSICAL EXAM:  GENERAL: NAD, well-groomed, well-developed  HEAD:  Atraumatic, Normocephalic  EYES: EOMI, PERRLA, conjunctiva and sclera clear  ENMT: No tonsillar erythema, exudates, or enlargement; Moist mucous membranes, Good dentition, No lesions  NECK: Supple, No JVD, Normal thyroid  NERVOUS SYSTEM:  Alert & Oriented X3, Good concentration; Motor Strength 5/5 B/L upper and lower extremities; DTRs 2+ intact and symmetric  CHEST/LUNG: Clear to percussion bilaterally; No rales, rhonchi, wheezing, or rubs  HEART: Regular rate and rhythm; No murmurs, rubs, or gallops  ABDOMEN: Soft, Nontender, Nondistended; Bowel sounds present  EXTREMITIES:  2+ Peripheral Pulses, No clubbing, cyanosis, or edema  LYMPH: No lymphadenopathy noted  SKIN: No rashes or lesions  LABS:                        10.9   11.86 )-----------( 394      ( 15 Richie 2020 07:02 )             35.6     01-15    144  |  111<H>  |  20<H>  ----------------------------<  94  4.5   |  26  |  0.85    Ca    8.7      15 Richie 2020 07:02    TPro  7.0  /  Alb  2.5<L>  /  TBili  0.3  /  DBili  x   /  AST  29  /  ALT  44  /  AlkPhos  116  01-14        CAPILLARY BLOOD GLUCOSE

## 2020-01-15 NOTE — PROGRESS NOTE ADULT - SUBJECTIVE AND OBJECTIVE BOX
Time of Visit:  Patient seen and examined.     MEDICATIONS  (STANDING):  albuterol/ipratropium for Nebulization. 3 milliLiter(s) Nebulizer every 6 hours  amLODIPine   Tablet 5 milliGRAM(s) Oral daily  cefTRIAXone   IVPB 1000 milliGRAM(s) IV Intermittent every 24 hours  enoxaparin Injectable 40 milliGRAM(s) SubCutaneous daily  influenza   Vaccine 0.5 milliLiter(s) IntraMuscular once  metoprolol succinate ER 50 milliGRAM(s) Oral daily  simvastatin 40 milliGRAM(s) Oral at bedtime  sodium chloride 0.9%. 1000 milliLiter(s) (80 mL/Hr) IV Continuous <Continuous>  venlafaxine XR. 150 milliGRAM(s) Oral daily      MEDICATIONS  (PRN):  acetaminophen   Tablet .. 650 milliGRAM(s) Oral every 6 hours PRN Mild Pain (1 - 3)  benzonatate 100 milliGRAM(s) Oral three times a day PRN Cough  guaiFENesin   Syrup  (Sugar-Free) 100 milliGRAM(s) Oral every 6 hours PRN Cough  ketorolac   Injectable 30 milliGRAM(s) IV Push every 8 hours PRN Severe Pain (7 - 10)       Medications up to date at time of exam.      PHYSICAL EXAMINATION:  Patient has no new complaints.  GENERAL: The patient is a well-developed, well-nourished, in no apparent distress.     Vital Signs Last 24 Hrs  T(C): 36.8 (15 Richie 2020 13:55), Max: 36.8 (14 Jan 2020 20:32)  T(F): 98.2 (15 Richie 2020 13:55), Max: 98.2 (14 Jan 2020 20:32)  HR: 90 (15 Richie 2020 15:16) (87 - 94)  BP: 170/71 (15 Richie 2020 13:55) (147/84 - 170/71)  BP(mean): --  RR: 18 (15 Richie 2020 13:55) (17 - 18)  SpO2: 96% (15 Richie 2020 15:16) (94% - 97%)   (if applicable)    Chest Tube (if applicable)    HEENT: Head is normocephalic and atraumatic. Extraocular muscles are intact. Mucous membranes are moist.     NECK: Supple, no palpable adenopathy.    LUNGS: Clear to auscultation, no wheezing, rales, or rhonchi.    HEART: Regular rate and rhythm without murmur.    ABDOMEN: Soft, nontender, and nondistended.  No hepatosplenomegaly is noted.    : No painful voiding, no pelvic pain    EXTREMITIES: Without any cyanosis, clubbing, rash, lesions or edema.    NEUROLOGIC: Awake, alert, oriented, grossly intact    SKIN: Warm, dry, good turgor.      LABS:                        10.9   11.86 )-----------( 394      ( 15 Richie 2020 07:02 )             35.6     01-15    144  |  111<H>  |  20<H>  ----------------------------<  94  4.5   |  26  |  0.85    Ca    8.7      15 Richie 2020 07:02    TPro  7.0  /  Alb  2.5<L>  /  TBili  0.3  /  DBili  x   /  AST  29  /  ALT  44  /  AlkPhos  116  01-14                        MICROBIOLOGY: (if applicable)    RADIOLOGY & ADDITIONAL STUDIES:  EKG:   CXR:  ECHO:    IMPRESSION: 69y Female PAST MEDICAL & SURGICAL HISTORY:  Latent tuberculosis  Hyperlipidemia  HTN (hypertension)   p/w       Impression; This is a  69 yr old  woman presented with cough with whitish sputum since 1 1/2 week which is getting worse. She had some blood on her sputum today.  She has been having low grade fever since same time period along with chills,  generalized weakness, decreased appetite, headache, back pain. Today when she went to [facility],  she felt like almost passing out and looked pale and clammy as per . Episode of SOB on exertion , Cough, Low grade Fever due to CXR showing Right lung Pneumonia and CT Chest with chronic Right Middle Lobe Pulmonary Embolus.    Echo is normal , no pul htn. If pat has chronic PE , there should be pul hypertension.. will need V/Q scan to confirm PE.  Maria Elena is going to live in [place] for 6 months and asked for all  work up to be done  . PAt is afebrile and WBC is trending down. Cultures neg. urology eval noted.. renal  mass will need nephrectomy after pna resolve. V/Q scan low prob for PE . Maria Elena stated that she is returning to  her home for further treatment     Suggestion;    -change therapeutic anticoag to prophy  -continue antibx as per ID  -duonebs q6h  -PT  -d/c O2  -possible d/c home for tomorr    d/c with attend

## 2020-01-15 NOTE — PROGRESS NOTE ADULT - ASSESSMENT
1.	Pneumonia(CAP)  2.	Leukocytosis - mild  3.	Renal mass  ·	dc planning tomorrow  ·	give Rocephin 1 gm iv tomorrow in am before patient is dc'ed  ·	patient will follow up on suspicious right renal carcinoma as an outpatient   ·	reconsult prn

## 2020-01-16 ENCOUNTER — TRANSCRIPTION ENCOUNTER (OUTPATIENT)
Age: 70
End: 2020-01-16

## 2020-01-16 VITALS
OXYGEN SATURATION: 94 % | HEART RATE: 100 BPM | TEMPERATURE: 98 F | SYSTOLIC BLOOD PRESSURE: 145 MMHG | RESPIRATION RATE: 18 BRPM | DIASTOLIC BLOOD PRESSURE: 55 MMHG

## 2020-01-16 LAB
ALBUMIN SERPL ELPH-MCNC: 2.5 G/DL — LOW (ref 3.5–5)
ALP SERPL-CCNC: 110 U/L — SIGNIFICANT CHANGE UP (ref 40–120)
ALT FLD-CCNC: 45 U/L DA — SIGNIFICANT CHANGE UP (ref 10–60)
ANION GAP SERPL CALC-SCNC: 5 MMOL/L — SIGNIFICANT CHANGE UP (ref 5–17)
AST SERPL-CCNC: 21 U/L — SIGNIFICANT CHANGE UP (ref 10–40)
BILIRUB SERPL-MCNC: 0.2 MG/DL — SIGNIFICANT CHANGE UP (ref 0.2–1.2)
BUN SERPL-MCNC: 18 MG/DL — SIGNIFICANT CHANGE UP (ref 7–18)
CALCIUM SERPL-MCNC: 8.9 MG/DL — SIGNIFICANT CHANGE UP (ref 8.4–10.5)
CHLORIDE SERPL-SCNC: 106 MMOL/L — SIGNIFICANT CHANGE UP (ref 96–108)
CO2 SERPL-SCNC: 27 MMOL/L — SIGNIFICANT CHANGE UP (ref 22–31)
CREAT SERPL-MCNC: 0.79 MG/DL — SIGNIFICANT CHANGE UP (ref 0.5–1.3)
CULTURE RESULTS: SIGNIFICANT CHANGE UP
GLUCOSE SERPL-MCNC: 104 MG/DL — HIGH (ref 70–99)
HCT VFR BLD CALC: 35.5 % — SIGNIFICANT CHANGE UP (ref 34.5–45)
HGB BLD-MCNC: 11 G/DL — LOW (ref 11.5–15.5)
MCHC RBC-ENTMCNC: 26.9 PG — LOW (ref 27–34)
MCHC RBC-ENTMCNC: 31 GM/DL — LOW (ref 32–36)
MCV RBC AUTO: 86.8 FL — SIGNIFICANT CHANGE UP (ref 80–100)
NRBC # BLD: 0 /100 WBCS — SIGNIFICANT CHANGE UP (ref 0–0)
PLATELET # BLD AUTO: 482 K/UL — HIGH (ref 150–400)
POTASSIUM SERPL-MCNC: 4.5 MMOL/L — SIGNIFICANT CHANGE UP (ref 3.5–5.3)
POTASSIUM SERPL-SCNC: 4.5 MMOL/L — SIGNIFICANT CHANGE UP (ref 3.5–5.3)
PROT SERPL-MCNC: 6.6 G/DL — SIGNIFICANT CHANGE UP (ref 6–8.3)
RBC # BLD: 4.09 M/UL — SIGNIFICANT CHANGE UP (ref 3.8–5.2)
RBC # FLD: 15.7 % — HIGH (ref 10.3–14.5)
SODIUM SERPL-SCNC: 138 MMOL/L — SIGNIFICANT CHANGE UP (ref 135–145)
SPECIMEN SOURCE: SIGNIFICANT CHANGE UP
WBC # BLD: 12.27 K/UL — HIGH (ref 3.8–10.5)
WBC # FLD AUTO: 12.27 K/UL — HIGH (ref 3.8–10.5)

## 2020-01-16 PROCEDURE — 83735 ASSAY OF MAGNESIUM: CPT

## 2020-01-16 PROCEDURE — 86803 HEPATITIS C AB TEST: CPT

## 2020-01-16 PROCEDURE — 84100 ASSAY OF PHOSPHORUS: CPT

## 2020-01-16 PROCEDURE — 80048 BASIC METABOLIC PNL TOTAL CA: CPT

## 2020-01-16 PROCEDURE — 36415 COLL VENOUS BLD VENIPUNCTURE: CPT

## 2020-01-16 PROCEDURE — 87186 SC STD MICRODIL/AGAR DIL: CPT

## 2020-01-16 PROCEDURE — 83036 HEMOGLOBIN GLYCOSYLATED A1C: CPT

## 2020-01-16 PROCEDURE — 99285 EMERGENCY DEPT VISIT HI MDM: CPT | Mod: 25

## 2020-01-16 PROCEDURE — 87070 CULTURE OTHR SPECIMN AEROBIC: CPT

## 2020-01-16 PROCEDURE — 93306 TTE W/DOPPLER COMPLETE: CPT

## 2020-01-16 PROCEDURE — 94640 AIRWAY INHALATION TREATMENT: CPT

## 2020-01-16 PROCEDURE — 71046 X-RAY EXAM CHEST 2 VIEWS: CPT

## 2020-01-16 PROCEDURE — 84484 ASSAY OF TROPONIN QUANT: CPT

## 2020-01-16 PROCEDURE — 78582 LUNG VENTILAT&PERFUS IMAGING: CPT

## 2020-01-16 PROCEDURE — 96374 THER/PROPH/DIAG INJ IV PUSH: CPT

## 2020-01-16 PROCEDURE — 82306 VITAMIN D 25 HYDROXY: CPT

## 2020-01-16 PROCEDURE — 83605 ASSAY OF LACTIC ACID: CPT

## 2020-01-16 PROCEDURE — 85027 COMPLETE CBC AUTOMATED: CPT

## 2020-01-16 PROCEDURE — 87086 URINE CULTURE/COLONY COUNT: CPT

## 2020-01-16 PROCEDURE — 99238 HOSP IP/OBS DSCHRG MGMT 30/<: CPT

## 2020-01-16 PROCEDURE — 80053 COMPREHEN METABOLIC PANEL: CPT

## 2020-01-16 PROCEDURE — 71045 X-RAY EXAM CHEST 1 VIEW: CPT

## 2020-01-16 PROCEDURE — 87631 RESP VIRUS 3-5 TARGETS: CPT

## 2020-01-16 PROCEDURE — 93970 EXTREMITY STUDY: CPT

## 2020-01-16 PROCEDURE — 84145 PROCALCITONIN (PCT): CPT

## 2020-01-16 PROCEDURE — 82607 VITAMIN B-12: CPT

## 2020-01-16 PROCEDURE — 87040 BLOOD CULTURE FOR BACTERIA: CPT

## 2020-01-16 PROCEDURE — 74178 CT ABD&PLV WO CNTR FLWD CNTR: CPT

## 2020-01-16 PROCEDURE — 84443 ASSAY THYROID STIM HORMONE: CPT

## 2020-01-16 PROCEDURE — 81001 URINALYSIS AUTO W/SCOPE: CPT

## 2020-01-16 PROCEDURE — 96375 TX/PRO/DX INJ NEW DRUG ADDON: CPT

## 2020-01-16 PROCEDURE — 76775 US EXAM ABDO BACK WALL LIM: CPT

## 2020-01-16 PROCEDURE — 71275 CT ANGIOGRAPHY CHEST: CPT

## 2020-01-16 PROCEDURE — 80061 LIPID PANEL: CPT

## 2020-01-16 PROCEDURE — 83690 ASSAY OF LIPASE: CPT

## 2020-01-16 RX ORDER — SENNA PLUS 8.6 MG/1
1 TABLET ORAL ONCE
Refills: 0 | Status: COMPLETED | OUTPATIENT
Start: 2020-01-16 | End: 2020-01-16

## 2020-01-16 RX ORDER — TRAMADOL HYDROCHLORIDE 50 MG/1
1 TABLET ORAL
Qty: 10 | Refills: 0
Start: 2020-01-16 | End: 2020-01-20

## 2020-01-16 RX ORDER — POLYETHYLENE GLYCOL 3350 17 G/17G
17 POWDER, FOR SOLUTION ORAL ONCE
Refills: 0 | Status: COMPLETED | OUTPATIENT
Start: 2020-01-16 | End: 2020-01-16

## 2020-01-16 RX ADMIN — Medication 150 MILLIGRAM(S): at 11:10

## 2020-01-16 RX ADMIN — POLYETHYLENE GLYCOL 3350 17 GRAM(S): 17 POWDER, FOR SOLUTION ORAL at 06:02

## 2020-01-16 RX ADMIN — Medication 100 MILLIGRAM(S): at 05:32

## 2020-01-16 RX ADMIN — Medication 100 MILLIGRAM(S): at 11:10

## 2020-01-16 RX ADMIN — ENOXAPARIN SODIUM 40 MILLIGRAM(S): 100 INJECTION SUBCUTANEOUS at 11:12

## 2020-01-16 RX ADMIN — SENNA PLUS 1 TABLET(S): 8.6 TABLET ORAL at 06:02

## 2020-01-16 RX ADMIN — AMLODIPINE BESYLATE 5 MILLIGRAM(S): 2.5 TABLET ORAL at 05:32

## 2020-01-16 RX ADMIN — Medication 3 MILLILITER(S): at 08:41

## 2020-01-16 RX ADMIN — Medication 3 MILLILITER(S): at 03:11

## 2020-01-16 RX ADMIN — Medication 100 MILLIGRAM(S): at 13:34

## 2020-01-16 RX ADMIN — Medication 650 MILLIGRAM(S): at 11:14

## 2020-01-16 RX ADMIN — Medication 50 MILLIGRAM(S): at 05:32

## 2020-01-16 RX ADMIN — Medication 650 MILLIGRAM(S): at 12:15

## 2020-01-16 NOTE — DISCHARGE NOTE NURSING/CASE MANAGEMENT/SOCIAL WORK - PATIENT PORTAL LINK FT
You can access the FollowMyHealth Patient Portal offered by [facility] by registering at the following website: [email or web address]. By joining [facility]’s FollowMyHealth portal, you will also be able to view your health information using other applications (apps) compatible with our system.

## 2020-01-16 NOTE — DIETITIAN INITIAL EVALUATION ADULT. - PERTINENT MEDS FT
MEDICATIONS  (STANDING):  amLODIPine   Tablet 5 milliGRAM(s) Oral daily  cefTRIAXone   IVPB 1000 milliGRAM(s) IV Intermittent every 24 hours  enoxaparin Injectable 40 milliGRAM(s) SubCutaneous daily  influenza   Vaccine 0.5 milliLiter(s) IntraMuscular once  metoprolol succinate ER 50 milliGRAM(s) Oral daily  simvastatin 40 milliGRAM(s) Oral at bedtime  sodium chloride 0.9%. 1000 milliLiter(s) (80 mL/Hr) IV Continuous <Continuous>  venlafaxine XR. 150 milliGRAM(s) Oral daily    MEDICATIONS  (PRN):  acetaminophen   Tablet .. 650 milliGRAM(s) Oral every 6 hours PRN Mild Pain (1 - 3)  benzonatate 100 milliGRAM(s) Oral three times a day PRN Cough  guaiFENesin   Syrup  (Sugar-Free) 100 milliGRAM(s) Oral every 6 hours PRN Cough  ketorolac   Injectable 30 milliGRAM(s) IV Push every 8 hours PRN Severe Pain (7 - 10)

## 2020-01-16 NOTE — DIETITIAN INITIAL EVALUATION ADULT. - PERTINENT LABORATORY DATA
01-16 Na138 mmol/L Glu 104 mg/dL<H> K+ 4.5 mmol/L Cr  0.79 mg/dL BUN 18 mg/dL 01-10 Phos 3.6 mg/dL 01-16 Alb 2.5 g/dL<L> 01-10 [id number] 5.8 %<H> 01-10 Chol 120 mg/dL LDL 41 mg/dL HDL 61 mg/dL Trig 91 mg/dL

## 2020-01-16 NOTE — PROGRESS NOTE ADULT - SUBJECTIVE AND OBJECTIVE BOX
Time of Visit:  Patient seen and examined.     MEDICATIONS  (STANDING):  amLODIPine   Tablet 5 milliGRAM(s) Oral daily  cefTRIAXone   IVPB 1000 milliGRAM(s) IV Intermittent every 24 hours  enoxaparin Injectable 40 milliGRAM(s) SubCutaneous daily  influenza   Vaccine 0.5 milliLiter(s) IntraMuscular once  metoprolol succinate ER 50 milliGRAM(s) Oral daily  simvastatin 40 milliGRAM(s) Oral at bedtime  sodium chloride 0.9%. 1000 milliLiter(s) (80 mL/Hr) IV Continuous <Continuous>  venlafaxine XR. 150 milliGRAM(s) Oral daily      MEDICATIONS  (PRN):  acetaminophen   Tablet .. 650 milliGRAM(s) Oral every 6 hours PRN Mild Pain (1 - 3)  benzonatate 100 milliGRAM(s) Oral three times a day PRN Cough  guaiFENesin   Syrup  (Sugar-Free) 100 milliGRAM(s) Oral every 6 hours PRN Cough  ketorolac   Injectable 30 milliGRAM(s) IV Push every 8 hours PRN Severe Pain (7 - 10)       Medications up to date at time of exam.    ROS; No fever, chills, cough, congestion. Denies SOB.   PHYSICAL EXAMINATION:    Vital Signs Last 24 Hrs  T(C): 36.9 (16 Jan 2020 05:05), Max: 36.9 (16 Jan 2020 05:05)  T(F): 98.4 (16 Jan 2020 05:05), Max: 98.4 (16 Jan 2020 05:05)  HR: 94 (16 Jan 2020 09:10) (89 - 98)  BP: 160/84 (16 Jan 2020 05:05) (149/69 - 170/71)  BP(mean): --  RR: 18 (16 Jan 2020 05:05) (18 - 18)  SpO2: 96% (16 Jan 2020 09:10) (94% - 97%)   (if applicable)    General; Alert and oriented. Able to answer question with no SOB. No acute distress.     HEENT: Head is normocephalic and atraumatic. No nasal tenderness. Extraocular muscles are intact. Mucous membranes are moist.     NECK: Supple, no palpable adenopathy.    LUNGS: Clear to auscultation, no wheezing, rales, or rhonchi. No use of accessory muscle.      HEART: S1 S2 Regular rate and no click/ rub.     ABDOMEN: Soft, nontender, and nondistended.  No abdominal guarding. Active bowel sounds .    EXTREMITIES: Without any cyanosis, clubbing, rash, lesions or edema.    NEUROLOGIC: Awake, alert, oriented.     SKIN: Warm and moist. Non diaphoretic.       LABS:                        11.0   12.27 )-----------( 482      ( 16 Jan 2020 08:02 )             35.5     01-16    138  |  106  |  18  ----------------------------<  104<H>  4.5   |  27  |  0.79    Ca    8.9      16 Jan 2020 08:02    TPro  6.6  /  Alb  2.5<L>  /  TBili  0.2  /  DBili  x   /  AST  21  /  ALT  45  /  AlkPhos  110  01-16    RADIOLOGY & ADDITIONAL STUDIES:  EKG:   CXR: < from: Xray Chest 1 View- PORTABLE-Urgent (01.13.20 @ 13:49) >  PROCEDURE DATE:  01/13/2020          INTERPRETATION:  AP semierect chest on January 13, 2020 at 1:25 PM. Patient has tubulointerstitial nephritis.    CT chest of January 9 showed either a chronic right middle lobe embolus or pulmonary window.    Heart is magnified by technique.    There is a right base infiltrate with associated pleural reaction. This is markedly increased from January 9 of this year.    IMPRESSION: Increasing right base process.      IMPRESSION: 69y Female PAST MEDICAL & SURGICAL HISTORY:  Latent tuberculosis  Hyperlipidemia  HTN (hypertension)    Impression; 68 Y/O Female presented with cough with whitish sputum since 1 1/2 week which is getting worse. She had some blood on her sputum today.  She has been having low grade fever since same time period along with chills,  generalized weakness, decreased appetite, headache, back pain. Today when she went to [facility],  she felt like almost passing out and looked pale and clammy as per . Episode of SOB on exertion , Cough, Low grade Fever due to CXR showing Right lung Pneumonia and CT Chest with chronic Right Middle Lobe Pulmonary Embolus.     Cultures neg. urology eval noted.. renal  mass will need nephrectomy after pna resolve. V/Q scan low prob for PE . Pat stated that she is returning to  her home for further treatment     Suggestion;    -change therapeutic anticoag to prophy    Suggestion;  O2 saturation 96% room air. Saturation been improved. No need for continuous Oxygen supplementation.   Oral hygiene care.   Continue antibiotic   Continue Lovenox SQ daily. Per patient she verbalized that she knows how to give herself SQ injection but need return demonstration if going home with Lovenox SQ . Change therapeutic anticoagulation  to prophylactic.   Reinforced the importance of Daily compliance to daily medication, per patient she is compliant.

## 2020-01-16 NOTE — PROGRESS NOTE ADULT - SUBJECTIVE AND OBJECTIVE BOX
HPI:  Pt is a 70 y/o F with PMH of HTN, HLD, Latent TB PSH of rt. knee surgery who presented with cough, runny nose, weakness and fever since 1 and half week.  According to  the pt, she started developing cough with whitish sputum since 1 1/2 week which is getting worse. She had some blood on her sputum today.  She has been having low grade fever since same time period along with chills,  generalized weakness, decreased appetite, headache, back pain. Today when she went to [facility],  she felt like almost passing out and looked pale and clammy as per .  She also complained of burning sensation while passing urine  and occasional urinary incontinence. (09 Jan 2020 21:54)      Patient is a 69y old  Female who presents with a chief complaint of Cough, fever, (16 Jan 2020 11:03)      INTERVAL HPI/OVERNIGHT EVENTS:  T(C): 36.9 (01-16-20 @ 05:05), Max: 36.9 (01-16-20 @ 05:05)  HR: 94 (01-16-20 @ 09:10) (89 - 98)  BP: 160/84 (01-16-20 @ 05:05) (149/69 - 170/71)  RR: 18 (01-16-20 @ 05:05) (18 - 18)  SpO2: 96% (01-16-20 @ 09:10) (94% - 97%)  Wt(kg): --  I&O's Summary      REVIEW OF SYSTEMS: denies fever, chills, SOB, palpitations, chest pain, abdominal pain, nausea, vomitting, diarrhea, constipation, dizziness    MEDICATIONS  (STANDING):  amLODIPine   Tablet 5 milliGRAM(s) Oral daily  cefTRIAXone   IVPB 1000 milliGRAM(s) IV Intermittent every 24 hours  enoxaparin Injectable 40 milliGRAM(s) SubCutaneous daily  influenza   Vaccine 0.5 milliLiter(s) IntraMuscular once  metoprolol succinate ER 50 milliGRAM(s) Oral daily  simvastatin 40 milliGRAM(s) Oral at bedtime  sodium chloride 0.9%. 1000 milliLiter(s) (80 mL/Hr) IV Continuous <Continuous>  venlafaxine XR. 150 milliGRAM(s) Oral daily    MEDICATIONS  (PRN):  acetaminophen   Tablet .. 650 milliGRAM(s) Oral every 6 hours PRN Mild Pain (1 - 3)  benzonatate 100 milliGRAM(s) Oral three times a day PRN Cough  guaiFENesin   Syrup  (Sugar-Free) 100 milliGRAM(s) Oral every 6 hours PRN Cough  ketorolac   Injectable 30 milliGRAM(s) IV Push every 8 hours PRN Severe Pain (7 - 10)      PHYSICAL EXAM:  GENERAL: NAD, well-groomed, well-developed  HEAD:  Atraumatic, Normocephalic  EYES: EOMI, PERRLA, conjunctiva and sclera clear  ENMT: No tonsillar erythema, exudates, or enlargement; Moist mucous membranes, Good dentition, No lesions  NECK: Supple, No JVD, Normal thyroid  NERVOUS SYSTEM:  Alert & Oriented X3, Good concentration; Motor Strength 5/5 B/L upper and lower extremities; DTRs 2+ intact and symmetric  CHEST/LUNG: Clear to percussion bilaterally; No rales, rhonchi, wheezing, or rubs  HEART: Regular rate and rhythm; No murmurs, rubs, or gallops  ABDOMEN: Soft, Nontender, Nondistended; Bowel sounds present  EXTREMITIES:  2+ Peripheral Pulses, No clubbing, cyanosis, or edema  LYMPH: No lymphadenopathy noted  SKIN: No rashes or lesions  LABS:                        11.0   12.27 )-----------( 482      ( 16 Jan 2020 08:02 )             35.5     01-16    138  |  106  |  18  ----------------------------<  104<H>  4.5   |  27  |  0.79    Ca    8.9      16 Jan 2020 08:02    TPro  6.6  /  Alb  2.5<L>  /  TBili  0.2  /  DBili  x   /  AST  21  /  ALT  45  /  AlkPhos  110  01-16        CAPILLARY BLOOD GLUCOSE

## 2020-01-16 NOTE — PROGRESS NOTE ADULT - ASSESSMENT
francia nd examined vsstable afebrile physical unchaged    on chair comfortable  denies sob or cp  walking around  was seen by pulmonary yesterday  o2 was d/cd  sbp slightly higher  low salt  as per ID d/c all antibxs     advised d/c to community, plenty of water, f/u pcp in 3-4 days  out pt f/u urology , pulmonary  cxr out pt   wt reduction   on effexer  she will start labs out pt  if high fever  productive cough, sob call md or come to ER

## 2020-01-16 NOTE — PROGRESS NOTE ADULT - REASON FOR ADMISSION
Cough, fever,

## 2020-01-16 NOTE — DIETITIAN INITIAL EVALUATION ADULT. - PROBLEM SELECTOR PLAN 5
Pt takes amlodipine and metoprolol at home  We will hold BP meds in the setting of PNA  Monitor Blood pressure

## 2020-01-16 NOTE — DIETITIAN INITIAL EVALUATION ADULT. - OTHER INFO
Patient from home lives . Visited pt. alert, report po intake " fair" >1wk with fever & SOB, tried to keep up with her "3 meals", denies nausea/vomiting or diarrhea PTA, able to chew/swallow without difficulty, stated  Lbs & stable for couple of year? & with limited physical activity due knee surgery 1 year ago. Patient from home lives . Visited pt. alert, report po intake " fair" >1wk with fever & SOB, tried to keep up with her "3 meals", denies nausea/vomiting or diarrhea PTA, able to chew/swallow without difficulty, stated  Lbs & stable for couple of year? & with limited physical activity due knee surgery 1 year ago. Presently consuming >50% of meals & tolerating, per flow sheet intake 100%, pt. offered but declined nutrition. skin intact.

## 2020-01-16 NOTE — CHART NOTE - NSCHARTNOTEFT_GEN_A_CORE
EVENT: Constipation: Patient reports not having a bowel movement in 3 days and is requesting a laxative. Miralax and senna ordered x 1.      OBJECTIVE:  Vital Signs Last 24 Hrs  T(C): 36.9 (16 Jan 2020 05:05), Max: 36.9 (16 Jan 2020 05:05)  T(F): 98.4 (16 Jan 2020 05:05), Max: 98.4 (16 Jan 2020 05:05)  HR: 98 (16 Jan 2020 05:05) (87 - 98)  BP: 160/84 (16 Jan 2020 05:05) (149/69 - 170/71)  BP(mean): --  RR: 18 (16 Jan 2020 05:05) (18 - 18)  SpO2: 95% (16 Jan 2020 05:05) (94% - 97%)    FOCUSED PHYSICAL EXAM: Abdomen soft, non-tender, non-distended    LABS:                        10.9   11.86 )-----------( 394      ( 15 Richie 2020 07:02 )             35.6     01-15    144  |  111<H>  |  20<H>  ----------------------------<  94  4.5   |  26  |  0.85    Ca    8.7      15 Richie 2020 07:02    TPro  7.0  /  Alb  2.5<L>  /  TBili  0.3  /  DBili  x   /  AST  29  /  ALT  44  /  AlkPhos  116  01-14      ASSESSMENT:  HPI:  Pt is a 70 y/o F with PMH of HTN, HLD, Latent TB PSH of rt. knee surgery who presented with cough, runny nose, weakness and fever since 1 and half week.  According to  the pt, she started developing cough with whitish sputum since 1 1/2 week which is getting worse. She had some blood on her sputum today.  She has been having low grade fever since same time period along with chills,  generalized weakness, decreased appetite, headache, back pain. Today when she went to [facility],  she felt like almost passing out and looked pale and clammy as per .  She also complained of burning sensation while passing urine  and occasional urinary incontinence. (09 Jan 2020 21:54)      PLAN: Constipation  -Miralax x 1  -Senna tab x 1  - Consider daily GI regimen     FOLLOW UP / RESULT:

## 2020-03-27 ENCOUNTER — APPOINTMENT (OUTPATIENT)
Dept: RADIOLOGY | Facility: HOSPITAL | Age: 70
End: 2020-03-27

## 2020-09-21 NOTE — ED PROVIDER NOTE - NS ED MD DISPO ADMITTING SERVICE
Benefits, risks, and possible complications of procedure explained to patient/caregiver who verbalized understanding and gave written consent. FMED

## 2022-05-17 NOTE — PROGRESS NOTE ADULT - SUBJECTIVE AND OBJECTIVE BOX
Discussed the risks/benefits of laser capsulotomy. Laser recommended. Patient elects to proceed. NP Note discussed with  Primary Attending    Patient is a 69y old  Female who presents with a chief complaint of Cough, fever, (13 Jan 2020 14:45)      HPI: 69 year old Woman with hx of HTN, HLD, Latent TB PSH of rt. knee surgery who presented on 1/9/2020 with c/o 10 day hx of cough, runny nose, weakness, chills, fever with blood tinged sputum same day of ED arrival. Found to have RLL PNA; pt admitted for CAP and CT angio with possible chronic PE.  Seen by pulmonologist Dr. Lindsey, will need V/Q scan to confirm PE.  Patient is going to live in [place] for 6 months and asked for all  work up to be done. Afebrile, WBC trending down well. Blood culture is negative. Still have SOB at times but improving a lot. Continue duoneb nebulizer, Rt flank pain controlled well on IV tylenol per pt.  failed VQ scan yesterday due to pain she couldn't stay on supine position, dilaudid 0.25mg was given prior to test this morning.     INTERVAL HPI/OVERNIGHT EVENTS: no new complaints    MEDICATIONS  (STANDING):  albuterol/ipratropium for Nebulization. 3 milliLiter(s) Nebulizer every 6 hours  amLODIPine   Tablet 2.5 milliGRAM(s) Oral daily  cefTRIAXone   IVPB 1000 milliGRAM(s) IV Intermittent every 24 hours  enoxaparin Injectable 70 milliGRAM(s) SubCutaneous two times a day  influenza   Vaccine 0.5 milliLiter(s) IntraMuscular once  metoprolol succinate ER 50 milliGRAM(s) Oral daily  simvastatin 40 milliGRAM(s) Oral at bedtime  sodium chloride 0.9%. 1000 milliLiter(s) (80 mL/Hr) IV Continuous <Continuous>  venlafaxine XR. 150 milliGRAM(s) Oral daily    MEDICATIONS  (PRN):  acetaminophen   Tablet .. 650 milliGRAM(s) Oral every 6 hours PRN Mild Pain (1 - 3)  benzonatate 100 milliGRAM(s) Oral three times a day PRN Cough  guaiFENesin   Syrup  (Sugar-Free) 100 milliGRAM(s) Oral every 6 hours PRN Cough  ketorolac   Injectable 30 milliGRAM(s) IV Push every 8 hours PRN Severe Pain (7 - 10)      __________________________________________________  REVIEW OF SYSTEMS:    CONSTITUTIONAL: No fever,   EYES: no acute visual disturbances  NECK: No pain or stiffness  RESPIRATORY: No cough; shortness of breath  CARDIOVASCULAR: No chest pain, no palpitations  GASTROINTESTINAL:  No nausea or vomiting; No diarrhea, Rt lower back pain   NEUROLOGICAL: No headache or numbness, no tremors  MUSCULOSKELETAL: No joint pain, no muscle pain  GENITOURINARY: no dysuria, no frequency, no hesitancy  PSYCHIATRY: no depression , no anxiety  ALL OTHER  ROS negative        Vital Signs Last 24 Hrs  T(C): 36.5 (14 Jan 2020 05:20), Max: 36.9 (13 Jan 2020 20:18)  T(F): 97.7 (14 Jan 2020 05:20), Max: 98.5 (13 Jan 2020 20:18)  HR: 88 (14 Jan 2020 09:33) (88 - 104)  BP: 147/82 (14 Jan 2020 05:20) (147/82 - 167/73)  BP(mean): --  RR: 18 (14 Jan 2020 05:20) (18 - 18)  SpO2: 96% (14 Jan 2020 09:33) (95% - 100%)    ________________________________________________  PHYSICAL EXAM:  GENERAL: NAD  HEENT: Normocephalic;  conjunctivae and sclerae clear; moist mucous membranes;   NECK : supple  CHEST/LUNG: Clear to auscultation bilaterally with good air entry, (+) supplemental O2   HEART: S1 S2  regular; no murmurs, gallops or rubs  ABDOMEN: Soft, Nontender, Nondistended; Bowel sounds present  EXTREMITIES: no cyanosis; no edema; no calf tenderness  SKIN: warm and dry; no rash  NERVOUS SYSTEM:  Awake and alert; Oriented  to place, person and time ; no new deficits    _________________________________________________  LABS:                        11.7   13.65 )-----------( 430      ( 14 Jan 2020 08:39 )             38.3     01-14    140  |  107  |  18  ----------------------------<  102<H>  4.0   |  27  |  0.83    Ca    8.6      14 Jan 2020 08:39  Mg     1.9     01-13    TPro  7.0  /  Alb  2.5<L>  /  TBili  0.3  /  DBili  x   /  AST  29  /  ALT  44  /  AlkPhos  116  01-14        CAPILLARY BLOOD GLUCOSE            RADIOLOGY & ADDITIONAL TESTS:    EXAM:  NM PULM VENTILATION PERFUS IMG                            PROCEDURE DATE:  01/14/2020          INTERPRETATION:  CLINICAL INFORMATION: 69 year-old female with shortness of breath, referred to evaluate for pulmonary embolism.    RADIOPHARMACEUTICAL: 1 mCi Tc-99m-DTPA by inhalation; 6.1 mCi Tc-99m-MAA, I.V.    TECHNIQUE:  Ventilation and perfusion images of the lungs were obtained following administration of Tc-99m-DTPA and Tc-99m-MAA. Images were obtained in the anterior, posterior, both lateral, and all 4 oblique projections. This study was interpreted in conjunction with a chest radiograph of 1/13/2020    COMPARISON: No previous lung V/Q scan for comparison     FINDINGS: There is heterogeneous radiotracer distribution in the lungs on both the ventilation and the perfusion images. There is no segmental perfusion defect.      IMPRESSION:  Very low probability of pulmonary embolus.        EXAM:  CT ANGIO CHEST (W)AW IC                            PROCEDURE DATE:  01/09/2020          INTERPRETATION:  CLINICAL INFORMATION: Cough and chest pain    COMPARISON: None.    PROCEDURE:   CT Angiography of the Chest.  60 ml of Omnipaque 350 was injected intravenously. 40 ml were discarded.  Sagittal and coronal reformats were performed as well as 3D (MIP) reconstructions.      FINDINGS:    LUNGS AND AIRWAYS: Patent central airways.  Right lower lobe consolidation. Dependent and basilar atelectasis. Nodular density along the horizontal fissure, possibly lymph node.    PLEURA: Small right pleural effusion.    MEDIASTINUM AND CHAMP: Prominent mediastinal and right hilar lymph nodes.    VESSELS: Atherosclerotic changes. No thoracic aortic aneurysm or dissection. Linear filling defect within the middle lobe branch of the right pulmonary artery, possibly chronic embolus or web. Otherwise, no acute appearing pulmonary arterial filling defects.    HEART: Heart size is normal. No pericardial effusion.    CHEST WALL AND LOWER NECK: Within normal limits.    VISUALIZED UPPER ABDOMEN: Partially imaged right upper pole renal mass measuring 3.7 cm or partially imaged prominent congenital lobulation.    BONES: Degenerative changes.    IMPRESSION:     1. 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  2. Otherwise, no evidence of acute pulmonary emboli.  3. Right lower lobe pneumonia.  4. Partially imaged right upper pole renal mass or prominent congenital lobulation. Recommend renal ultrasound for further assessment.          IVONNE HALE M.D., ATTENDING RADIOLOGIST  This document has been electronically signed. Jan 9 2020  3:56PM            EXAM:  US KIDNEY(S)                            PROCEDURE DATE:  01/09/2020          INTERPRETATION:  CLINICAL INFORMATION: Possible kidney mass, possible pyelonephritis    COMPARISON: CT angiogram of the chest of earlier in the day.    TECHNIQUE: Sonography of the kidneys and bladder. Study is limited by large patient body habitus    FINDINGS:    Right kidney:  9.3 cm. No renal mass, hydronephrosis or calculi.    Left kidney:  9.9 cm. No renal mass, hydronephrosis or calculi.    Urinary bladder: Within normal limits.    IMPRESSION:     Limited by large body habitus. No gross abnormality seen. However cannot exclude right renal mass based on this imaging and correlation with CT or MR imaging of the abdomen is recommended            TAYLOR DEE M.D.,ATTENDING RADIOLOGIST  This document has been electronically signed. Jan 9 2020  7:57PM              EXAM:  US DPLX LWR EXT VEINS COMPL BI                            PROCEDURE DATE:  01/09/2020          INTERPRETATION:  CLINICAL INFORMATION: Possible pulmonary embolism, shortness of breath    COMPARISON: None available.    TECHNIQUE: Duplex sonography of the BILATERAL LOWER extremity veins with color and spectral Doppler, with and without compression.      FINDINGS:    There is normal compressibility of the bilateral common femoral, femoral and popliteal veins.     Doppler examination shows normal spontaneous and phasic flow.    No calf vein thrombosis is detected.    IMPRESSION:     No evidence of deep venous thrombosis in either lower extremity.          TAYLOR DEE M.D.,ATTENDING RADIOLOGIST  This document has been electronically signed. Jan 9 2020  8:00PM         EXAM:  XR CHEST PA LAT 2V                            PROCEDURE DATE:  01/09/2020          INTERPRETATION:  Frontal and lateral chest on January 9, 2020 3:41 PM. Patient has cough and weakness.    Heart is normal for projection.    There is a right base infiltrate medially that is better seen on CAT scan of the same day.    IMPRESSION: Right base infiltrate.                DAO ISRAEL M.D., ATTENDING RADIOLOGIST  This document has been electronically signed. Jan 9 2020  3:58PM                    MARBIN OROZCO M.D., NUCLEAR MEDICINE ATTENDING  This document has been electronically signed. Jan 14 2020 11:04AM    Imaging Personally Reviewed:  YES    Consultant(s) Notes Reviewed:   YES    Care Discussed with Consultants : ID/ Pulmonology / urology    Plan of care was discussed with patient and /or primary care giver; all questions and concerns were addressed and care was aligned with patient's wishes. NP Note discussed with  Primary Attending    Patient is a 69y old  Female who presents with a chief complaint of Cough, fever, (13 Jan 2020 14:45)      HPI: 69 year old Woman with hx of HTN, HLD, Latent TB PSH of rt. knee surgery who presented on 1/9/2020 with c/o 10 day hx of cough, runny nose, weakness, chills, fever with blood tinged sputum same day of ED arrival. Found to have RLL PNA; pt admitted for CAP and CT angio with possible chronic PE.  Seen by pulmonologist Dr. Lindsey, will need V/Q scan to confirm PE.  Patient is going to live in [place] for 6 months and asked for all  work up to be done. Afebrile, WBC trending down well. Blood culture is negative. Still have SOB at times but improving a lot. Continue duoneb nebulizer, Rt flank pain controlled well on IV tylenol per pt.  failed VQ scan yesterday due to pain she couldn't stay on supine position, dilaudid 0.25mg was given prior to test this morning. Able to complete VQ scan and CT A/P.     INTERVAL HPI/OVERNIGHT EVENTS: no new complaints    MEDICATIONS  (STANDING):  albuterol/ipratropium for Nebulization. 3 milliLiter(s) Nebulizer every 6 hours  amLODIPine   Tablet 2.5 milliGRAM(s) Oral daily  cefTRIAXone   IVPB 1000 milliGRAM(s) IV Intermittent every 24 hours  enoxaparin Injectable 70 milliGRAM(s) SubCutaneous two times a day  influenza   Vaccine 0.5 milliLiter(s) IntraMuscular once  metoprolol succinate ER 50 milliGRAM(s) Oral daily  simvastatin 40 milliGRAM(s) Oral at bedtime  sodium chloride 0.9%. 1000 milliLiter(s) (80 mL/Hr) IV Continuous <Continuous>  venlafaxine XR. 150 milliGRAM(s) Oral daily    MEDICATIONS  (PRN):  acetaminophen   Tablet .. 650 milliGRAM(s) Oral every 6 hours PRN Mild Pain (1 - 3)  benzonatate 100 milliGRAM(s) Oral three times a day PRN Cough  guaiFENesin   Syrup  (Sugar-Free) 100 milliGRAM(s) Oral every 6 hours PRN Cough  ketorolac   Injectable 30 milliGRAM(s) IV Push every 8 hours PRN Severe Pain (7 - 10)      __________________________________________________  REVIEW OF SYSTEMS:    CONSTITUTIONAL: No fever,   EYES: no acute visual disturbances  NECK: No pain or stiffness  RESPIRATORY: No cough; shortness of breath  CARDIOVASCULAR: No chest pain, no palpitations  GASTROINTESTINAL:  No nausea or vomiting; No diarrhea, Rt lower back pain   NEUROLOGICAL: No headache or numbness, no tremors  MUSCULOSKELETAL: No joint pain, no muscle pain  GENITOURINARY: no dysuria, no frequency, no hesitancy  PSYCHIATRY: no depression , no anxiety  ALL OTHER  ROS negative        Vital Signs Last 24 Hrs  T(C): 36.5 (14 Jan 2020 05:20), Max: 36.9 (13 Jan 2020 20:18)  T(F): 97.7 (14 Jan 2020 05:20), Max: 98.5 (13 Jan 2020 20:18)  HR: 88 (14 Jan 2020 09:33) (88 - 104)  BP: 147/82 (14 Jan 2020 05:20) (147/82 - 167/73)  BP(mean): --  RR: 18 (14 Jan 2020 05:20) (18 - 18)  SpO2: 96% (14 Jan 2020 09:33) (95% - 100%)    ________________________________________________  PHYSICAL EXAM:  GENERAL: NAD  HEENT: Normocephalic;  conjunctivae and sclerae clear; moist mucous membranes;   NECK : supple  CHEST/LUNG: Clear to auscultation bilaterally with good air entry, (+) supplemental O2   HEART: S1 S2  regular; no murmurs, gallops or rubs  ABDOMEN: Soft, Nontender, Nondistended; Bowel sounds present  EXTREMITIES: no cyanosis; no edema; no calf tenderness  SKIN: warm and dry; no rash  NERVOUS SYSTEM:  Awake and alert; Oriented  to place, person and time ; no new deficits    _________________________________________________  LABS:                        11.7   13.65 )-----------( 430      ( 14 Jan 2020 08:39 )             38.3     01-14    140  |  107  |  18  ----------------------------<  102<H>  4.0   |  27  |  0.83    Ca    8.6      14 Jan 2020 08:39  Mg     1.9     01-13    TPro  7.0  /  Alb  2.5<L>  /  TBili  0.3  /  DBili  x   /  AST  29  /  ALT  44  /  AlkPhos  116  01-14        CAPILLARY BLOOD GLUCOSE            RADIOLOGY & ADDITIONAL TESTS:    EXAM:  NM PULM VENTILATION PERFUS IMG                            PROCEDURE DATE:  01/14/2020          INTERPRETATION:  CLINICAL INFORMATION: 69 year-old female with shortness of breath, referred to evaluate for pulmonary embolism.    RADIOPHARMACEUTICAL: 1 mCi Tc-99m-DTPA by inhalation; 6.1 mCi Tc-99m-MAA, I.V.    TECHNIQUE:  Ventilation and perfusion images of the lungs were obtained following administration of Tc-99m-DTPA and Tc-99m-MAA. Images were obtained in the anterior, posterior, both lateral, and all 4 oblique projections. This study was interpreted in conjunction with a chest radiograph of 1/13/2020    COMPARISON: No previous lung V/Q scan for comparison     FINDINGS: There is heterogeneous radiotracer distribution in the lungs on both the ventilation and the perfusion images. There is no segmental perfusion defect.      IMPRESSION:  Very low probability of pulmonary embolus.        EXAM:  CT ANGIO CHEST (W)AW IC                            PROCEDURE DATE:  01/09/2020          INTERPRETATION:  CLINICAL INFORMATION: Cough and chest pain    COMPARISON: None.    PROCEDURE:   CT Angiography of the Chest.  60 ml of Omnipaque 350 was injected intravenously. 40 ml were discarded.  Sagittal and coronal reformats were performed as well as 3D (MIP) reconstructions.      FINDINGS:    LUNGS AND AIRWAYS: Patent central airways.  Right lower lobe consolidation. Dependent and basilar atelectasis. Nodular density along the horizontal fissure, possibly lymph node.    PLEURA: Small right pleural effusion.    MEDIASTINUM AND CHAMP: Prominent mediastinal and right hilar lymph nodes.    VESSELS: Atherosclerotic changes. No thoracic aortic aneurysm or dissection. Linear filling defect within the middle lobe branch of the right pulmonary artery, possibly chronic embolus or web. Otherwise, no acute appearing pulmonary arterial filling defects.    HEART: Heart size is normal. No pericardial effusion.    CHEST WALL AND LOWER NECK: Within normal limits.    VISUALIZED UPPER ABDOMEN: Partially imaged right upper pole renal mass measuring 3.7 cm or partially imaged prominent congenital lobulation.    BONES: Degenerative changes.    IMPRESSION:     1. 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  2. Otherwise, no evidence of acute pulmonary emboli.  3. Right lower lobe pneumonia.  4. Partially imaged right upper pole renal mass or prominent congenital lobulation. Recommend renal ultrasound for further assessment.          IVONNE HALE M.D., ATTENDING RADIOLOGIST  This document has been electronically signed. Jan 9 2020  3:56PM            EXAM:  US KIDNEY(S)                            PROCEDURE DATE:  01/09/2020          INTERPRETATION:  CLINICAL INFORMATION: Possible kidney mass, possible pyelonephritis    COMPARISON: CT angiogram of the chest of earlier in the day.    TECHNIQUE: Sonography of the kidneys and bladder. Study is limited by large patient body habitus    FINDINGS:    Right kidney:  9.3 cm. No renal mass, hydronephrosis or calculi.    Left kidney:  9.9 cm. No renal mass, hydronephrosis or calculi.    Urinary bladder: Within normal limits.    IMPRESSION:     Limited by large body habitus. No gross abnormality seen. However cannot exclude right renal mass based on this imaging and correlation with CT or MR imaging of the abdomen is recommended            TAYLOR DEE M.D.,ATTENDING RADIOLOGIST  This document has been electronically signed. Jan 9 2020  7:57PM              EXAM:  US DPLX LWR EXT VEINS COMPL BI                            PROCEDURE DATE:  01/09/2020          INTERPRETATION:  CLINICAL INFORMATION: Possible pulmonary embolism, shortness of breath    COMPARISON: None available.    TECHNIQUE: Duplex sonography of the BILATERAL LOWER extremity veins with color and spectral Doppler, with and without compression.      FINDINGS:    There is normal compressibility of the bilateral common femoral, femoral and popliteal veins.     Doppler examination shows normal spontaneous and phasic flow.    No calf vein thrombosis is detected.    IMPRESSION:     No evidence of deep venous thrombosis in either lower extremity.          TAYLOR DEE M.D.,ATTENDING RADIOLOGIST  This document has been electronically signed. Jan 9 2020  8:00PM         EXAM:  XR CHEST PA LAT 2V                            PROCEDURE DATE:  01/09/2020          INTERPRETATION:  Frontal and lateral chest on January 9, 2020 3:41 PM. Patient has cough and weakness.    Heart is normal for projection.    There is a right base infiltrate medially that is better seen on CAT scan of the same day.    IMPRESSION: Right base infiltrate.                DAO ISRAEL M.D., ATTENDING RADIOLOGIST  This document has been electronically signed. Jan 9 2020  3:58PM                    MARBIN OROZCO M.D., NUCLEAR MEDICINE ATTENDING  This document has been electronically signed. Jan 14 2020 11:04AM    Imaging Personally Reviewed:  YES    Consultant(s) Notes Reviewed:   YES    Care Discussed with Consultants : ID/ Pulmonology / urology    Plan of care was discussed with patient and /or primary care giver; all questions and concerns were addressed and care was aligned with patient's wishes.

## 2022-12-21 NOTE — ED ADULT TRIAGE NOTE - TEMPERATURE IN CELSIUS (DEGREES C)
37.2 Cartilage Graft Text: The defect edges were debeveled with a #15 scalpel blade.  Given the location of the defect, shape of the defect, the fact the defect involved a full thickness cartilage defect a cartilage graft was deemed most appropriate.  An appropriate donor site was identified, cleansed, and anesthetized. The cartilage graft was then harvested and transferred to the recipient site, oriented appropriately and then sutured into place.  The secondary defect was then repaired using a primary closure.

## 2023-06-04 NOTE — H&P ADULT - ENDOCRINE
details…
PAST MEDICAL HISTORY:  Depression     Depression     Hypothyroidism     Hypothyroidism     UTI (urinary tract infection)     Vertigo     Vertigo

## 2025-04-22 NOTE — PATIENT PROFILE ADULT - ANY IMPAIRED UPPER EXTREMITY FUNCTION WITHIN A WEEK PRIOR TO ADMISSION RELATED TO?
Department of Medicine  Division of Endocrinology        Chief Complaint: Magnolia Livingston is a 68 year old female comes in today for DM, and Medical weight management ,  patient of  January Rojas NP     This visit is being performed virtually via Video visit using [employer].   Clinical Location: Home  Magnolia's location Home and is physically present in   the [facility] at the time of this visit.    Diabetes Mellitus Type 2   Duration:  Diagnosed this year   Prior hospitalizations related to DM: no   DM regimen at home:  Metformin 500 qd   Mounjaro 15  mg weekly    (She was on Ozempic 1 mg)   Accucheks -per patient around 110- 120  - all in target range   Any hx of hypoglycemia: no   Latest HbA1c  5.3 was 5.0  Dietary compliance improving    Meals per day: 3   Uses carbohydrate count: no  Diabetes education: - yes,  dietician   Family history of DM: yes, father, brothers , sisters     Preventative Care:  HTN: no   On ACE-inhibitors/ARB: no   History of nephropathy:  No   Last urine microalbumin/creatinine 04/2025 see below   History of retinopathy: no   Last eye exam: within a year, due  in May   Hx of laser surgery: no   Peripheral neuropathy:  Yes   On treatment:  No  History of cerebrovascular disease:  No   History of hyperlipidemia:  Yes   Medications: lipitor   History of coronary artery disease:  No   On aspirin: yes asa 81 mg   History of gastroparesis: No  History of urinary incontinence: No      Lifestyle Modification:  Exercise: walking , had ankle surgery   Balanced carb-controlled diet: not all the time   Counseled on smoking and/or alcohol: not using   Self-foot examinations at home:yes     No History of pancreatitis   No History of thyroid cancer     6/15/2023  12:02 PM   [facility] Extended Vitals - Weight in Kg/Lb    /84    Pulse 91    Resp    Temp    Weight kg 126.1 kg    Weight lb 278 lb    Height 5' 6.5\"    Height cm 168.9 cm    BMI 44.2    Pulse Ox    Patient Position Sitting     BP Location RUE - Right upper extremity    Cuff Size Large Adult    Height - Patient Reported    Waist Circumference in inches    Measurements    Waist Circumference (in)      Diet plan - low carb, modified Mediterranean diet, stress now - taking care of sister ( sister has cancer)   Logging food - not now, did before   Carbohydrates per meal - not sure   Medications - Mounjaro  15 mg , metformin  Exercise - yes , [employer] - most cardio   Sleep - not very good, will follow up with sleep clinic,   Stress - yes -  family problems, sister's health    Kidney stones - yes on imaging   Depression - yes effexor, Cymbalta         Past Medical History:   Diagnosis Date    Anxiety     Coronary artery calcification 11/13/2023    Gastroesophageal reflux disease     Motion sickness     Osteoarthritis of multiple joints     Situational depression     treated in past with Venlaxafine and currently Cymbalta    Sleep apnea     Type 2 diabetes mellitus, without long-term current use of insulin  (CMD) 06/15/2023    Vocal cord dysfunction        Past Surgical History:   Procedure Laterality Date    Arthroscopy knee medial&latera Bilateral     Breast biopsy      rt 50's    Carpal tunnel release      bilateral    Cholecystectomy      Colonoscopy  01/01/2008    Colonoscopy  05/20/2015    Create new tubal opening      tubal reversal    Dexa bone density axial skeleton  08/03/2011    Hysterectomy       CRYSTAL with BSO [facility] ~1987- endometriosis    Joint replacement  october 2019    kness    Oophorectomy      36    Removal gallbladder      Repair of biceps tendon at elbow      Tubal ligation  about 41 years       Social History     Socioeconomic History    Marital status: /Civil Union     Spouse name: Not on file    Number of children: Not on file    Years of education: Not on file    Highest education level: Not on file   Occupational History    Not on file   Tobacco Use    Smoking status: Former     Current packs/day: 0.00      Average packs/day: 0.5 packs/day for 2.0 years (1.0 ttl pk-yrs)     Types: Cigarettes     Start date: 1977     Quit date: 1979     Years since quittin.3     Passive exposure: Past    Smokeless tobacco: Never    Tobacco comments:     Neck 44cm.    Vaping Use    Vaping status: never used   Substance and Sexual Activity    Alcohol use: Not Currently     Comment: rare to occasional    Drug use: No    Sexual activity: Yes     Partners: Male     Birth control/protection: Surgical   Other Topics Concern    Not on file   Social History Narrative    Not on file     Social Drivers of Health     Financial Resource Strain: Patient Declined (2024)    Financial Resource Strain     Unable to Get: Patient declined   Food Insecurity: Patient Declined (2024)    Food Insecurity     Worried about Food: Patient declined     Food is Gone: Patient declined   Transportation Needs: Patient Declined (2024)    Transportation Needs     Lack of Reliable Transportation: Patient declined   Physical Activity: Medium Risk (2024)    Exercise Vital Sign     Days of Exercise per Week: 2 days     Minutes of Exercise per Session: 20 min   Stress: Patient Declined (2024)    Stress     How Stressed: Patient declined   Social Connections: Patient Declined (2024)    Social Connections     Social Connectivity: Patient declined   Feeling safe in your relationship: Low Risk  (3/25/2024)    Interpersonal Safety     How often physically hurt: Never     How often insulted or talked down to: Never     How often threatened with harm: Never     How often scream or curse at: Never       Family History   Problem Relation Age of Onset    Heart disease Mother     Cancer Mother     Cancer, Prostate Father 68    Heart disease Father     Cancer Father         Prostate    Diabetes Father     Cancer Sister         peritoneal (first round), bladder cancer(second bout)along with liver, lung    Heart disease Sister     Diabetes Sister      Cancer, Lung Sister     Cancer, Liver Sister     Diabetes Sister     Diabetes Sister     Stroke Sister         TIAs    Heart disease Sister         CAD - diagnosed with cardiac Cath    Kidney disease Sister         stage 4    Heart disease Brother     Heart disease Brother     Diabetes Brother     Heart disease Brother     Diabetes Brother     Leukemia Daughter     Stroke Mother            Medications:  Current Outpatient Medications   Medication Sig    blood glucose (Pharmacist Choice Autocode) test strip Test blood sugar 1 to 2  times daily as directed. Diagnosis: E11.9. Meter: Insurance preference    blood glucose lancets Test blood sugar 1 to 2  times daily as directed. Diagnosis: E11.9. Meter: Insurance preference    tirzepatide (Mounjaro) 15 MG/0.5ML Solution Auto-injector Inject 15 mg into the skin every 7 days. Indications: Type 2 Diabetes    metFORMIN (GLUCOPHAGE) 500 MG tablet Take 1 tablet by mouth in the morning and 1 tablet in the evening. Begin taking on February 1, 2025.    omeprazole (PriLOSEC) 40 MG capsule Take 1 capsule by mouth daily.    atorvastatin (LIPITOR) 40 MG tablet Take 1 tablet by mouth daily.    DULoxetine (CYMBALTA) 60 MG capsule Take 1 capsule by mouth daily.    amoxicillin (AMOXIL) 500 MG capsule Take 2 tablets one hour prior to dental procedure    albuterol 108 (90 Base) MCG/ACT inhaler Inhale 2 puffs into the lungs every 4 hours as needed for Shortness of Breath or Wheezing.    NON FORMULARY Take by mouth daily. Memory Builder Supplement from [employer]  Contains-Cistanche Tubulosa Extract (root) 300 mg †  Ginkgo Biloba Extract (leaves) 120 mg    MAGNESIUM PO Take 2 tablets by mouth daily. Contain magnesium and vitamin D    blood glucose meter Test blood sugar 1 to 2  times daily as directed. Diagnosis: E11.9. Meter: Insurance preference    Multiple Vitamins-Minerals (ONE A DAY IMMUNITY DEFENSE PO) Immunity defense with zinc + Basil daily    DISPENSE Joint 2 supplement daily     aspirin 81 MG chewable tablet Chew 81 mg by mouth daily. As directed by Podiatrist     No current facility-administered medications for this visit.       ALLERGIES:  No Known Allergies    REVIEW OF SYSTEMS:  Patient denies fever, cough, SOB, chest pain, abdominal pain, nausea, emesis, urinary problems, diarrhea, constipation, joint pain, skin rashes, depression.   PHYSICAL EXAMINATION:  There were no vitals taken for this visit.    Patient reported weight 205 lbs     Constitutional: This is 68 year old year old female in no distress.   AAO (awake, alert, oriented) times 3.  Psychiatric:  Alert and oriented, normal mood and affect,, clear speech.  Neck: No visible  Masses or thyromegaly..  Lungs: unlabored breathing   Cardiovascular: no edema.    Skin: no visible  n rashes or ulcerations.   Musculoskeletal: Normal gait, no tremor, no clubbing or cyanosis.      Results:    Recent Labs   Lab 04/02/25 0758 09/26/24  0704   Hemoglobin A1C 5.3 5.0      ,   Recent Labs   Lab 04/02/25 0758 02/03/25  0958 01/02/25  0721   Sodium 142  --  139   Potassium 4.7  --  5.1   Glucose 102*  --  100*   BUN 26*  --  21*   Creatinine 0.94 0.85 0.91   Glomerular Filtration Rate 67 75 70   GOT/AST 11  --  13      , No results for input(s): \"FT3\" in the last 8765 hours.   , No results for input(s): \"T4FREE\" in the last 8765 hours.   ,   Recent Labs   Lab 04/02/25 0758 09/26/24  0704   Cholesterol 91 109   Triglycerides 105 145   HDL 34* 32*   CALCLDL 36 48   Non-HDL Cholesterol 57 77   Cholesterol/ HDL Ratio 2.7 3.4      ,   Recent Labs   Lab 04/02/25 0758 09/26/24  0704   TSH 0.810 1.435        ,   No results for input(s): \"VITD25\" in the last 8765 hours.     , and   Recent Labs   Lab 04/02/25  0758   Microalbumin, Urine 1.89   Creatinine, Urine 99.1   Microalbumin/ Creatinine Ratio 19.1         Body Composition Results     Body fat: 48.0%  Body fat mass: 111.4 lb  Fat free mass: 120.8 lb  Visceral fat rating: 15.0  Body water:  36.5%  Body water mass: 84.8lb  Muscle mass/score: 114.6 lb  Bone mass: 6.2 lb              Assessment/Plan:  Type 2 diabetes mellitus with other specified complication, without long-term current use of insulin (CMD)  (primary encounter diagnosis)  Type 2 diabetes mellitus with obesity (CMD)  Metabolic syndrome  DM type 2 with diabetic mixed hyperlipidemia (CMD)  Class 2 severe obesity due to excess calories with serious comorbidity and body mass index (BMI) of 34  in adult (CMD)  Wt 278 lbs   Now 204 lbs   27 % body weight   Diabetes is controlled,  Hba1c 5.3, continue  Life style modifications   Continue metformin 500 mg daily   Mounajaro  15 mg weekly  reviewed benefits, also for GIULIANA  Avoid high carb food, increase resistance exercises and water    Increase protein  Stress due to sister's health, counseling was offered     She will have colonoscopy, will stop Mounjaro one week prior   Stop metformin day of prep and do not take am of colonoscopy   Restart medications after colonoscopy   Depression with anxiety  Mood is good, more stress   GIULIANA (obstructive sleep apnea)   Will follow up pulmonology,  Mounjaro is beneficial   - Reviewed with patient the importance of blood glucose control in order to reduce acute & chronic complications of diabetes.  - Diet review - focus on whole grains, fruits & vegetables, low in fats & carbohydrates.  - Encouraged to adhere to DM/Renal friendly diet.   - Exercise - goal 30 minutes/day, 5 days/week. Any increase improves diabetes control.  - Patient was instructed to log blood sugar over the next few weeks and bring readings back to the clinic.   - Encouraged to follow up with ophthalmologist as per their recommendations.   - Patient encouraged to do daily foot exam.   - Encouraged patient to follow up with podiatry for calluses on both feet.    - HTN  Will monitor     - Hyperlipidemia  - Continue with statin for hyperlipidemia. Reports no side effects from medication. Lipid panel  improved   Total time spent  in this encounter was  45 minutes  A copy of this note was sent to the patient's PCP January Rojas NP    Follow up in 12 weeks  She needs a new glucometer - will send ( insurance preference )   CMP,   Will plan RD follow up in the future ( not good time now )   Nurse visit 4-5 weeks optional    no